# Patient Record
Sex: FEMALE | Race: WHITE | Employment: OTHER | URBAN - METROPOLITAN AREA
[De-identification: names, ages, dates, MRNs, and addresses within clinical notes are randomized per-mention and may not be internally consistent; named-entity substitution may affect disease eponyms.]

---

## 2018-01-17 PROBLEM — R19.7 DIARRHEA: Status: ACTIVE | Noted: 2018-01-17

## 2018-01-18 PROBLEM — E87.6 HYPOKALEMIA: Status: ACTIVE | Noted: 2018-01-18

## 2018-03-12 ENCOUNTER — TELEPHONE (OUTPATIENT)
Dept: FAMILY MEDICINE CLINIC | Age: 83
End: 2018-03-12

## 2018-03-22 ENCOUNTER — HOSPITAL ENCOUNTER (OUTPATIENT)
Dept: GENERAL RADIOLOGY | Age: 83
Discharge: HOME | End: 2018-03-24
Payer: COMMERCIAL

## 2018-03-22 ENCOUNTER — HOSPITAL ENCOUNTER (OUTPATIENT)
Age: 83
Discharge: HOME | End: 2018-03-24
Payer: COMMERCIAL

## 2018-03-22 DIAGNOSIS — M79.675 TOE PAIN, LEFT: ICD-10-CM

## 2018-03-22 PROCEDURE — 73660 X-RAY EXAM OF TOE(S): CPT

## 2018-03-23 DIAGNOSIS — M79.672 LEFT FOOT PAIN: Primary | ICD-10-CM

## 2018-04-09 RX ORDER — AMLODIPINE BESYLATE 5 MG/1
TABLET ORAL
Qty: 30 TABLET | Refills: 5 | Status: SHIPPED | OUTPATIENT
Start: 2018-04-09 | End: 2018-10-05 | Stop reason: SDUPTHER

## 2018-06-06 DIAGNOSIS — R05.9 COUGH: Primary | ICD-10-CM

## 2018-07-30 RX ORDER — PANTOPRAZOLE SODIUM 40 MG/1
40 TABLET, DELAYED RELEASE ORAL
Qty: 60 TABLET | Refills: 3 | Status: SHIPPED | OUTPATIENT
Start: 2018-07-30 | End: 2019-07-11 | Stop reason: SDUPTHER

## 2018-08-07 DIAGNOSIS — R05.9 COUGH: ICD-10-CM

## 2018-09-07 ENCOUNTER — OFFICE VISIT (OUTPATIENT)
Dept: FAMILY MEDICINE CLINIC | Age: 83
End: 2018-09-07
Payer: COMMERCIAL

## 2018-09-07 VITALS
DIASTOLIC BLOOD PRESSURE: 70 MMHG | OXYGEN SATURATION: 97 % | TEMPERATURE: 98.2 F | BODY MASS INDEX: 23.22 KG/M2 | WEIGHT: 123 LBS | SYSTOLIC BLOOD PRESSURE: 108 MMHG | HEART RATE: 76 BPM | HEIGHT: 61 IN | RESPIRATION RATE: 18 BRPM

## 2018-09-07 DIAGNOSIS — M79.671 RIGHT FOOT PAIN: ICD-10-CM

## 2018-09-07 DIAGNOSIS — M25.562 ACUTE PAIN OF LEFT KNEE: Primary | ICD-10-CM

## 2018-09-07 DIAGNOSIS — R05.9 COUGH: ICD-10-CM

## 2018-09-07 PROCEDURE — 1101F PT FALLS ASSESS-DOCD LE1/YR: CPT | Performed by: FAMILY MEDICINE

## 2018-09-07 PROCEDURE — 4040F PNEUMOC VAC/ADMIN/RCVD: CPT | Performed by: FAMILY MEDICINE

## 2018-09-07 PROCEDURE — G8420 CALC BMI NORM PARAMETERS: HCPCS | Performed by: FAMILY MEDICINE

## 2018-09-07 PROCEDURE — 99213 OFFICE O/P EST LOW 20 MIN: CPT | Performed by: FAMILY MEDICINE

## 2018-09-07 PROCEDURE — G8400 PT W/DXA NO RESULTS DOC: HCPCS | Performed by: FAMILY MEDICINE

## 2018-09-07 PROCEDURE — G8427 DOCREV CUR MEDS BY ELIG CLIN: HCPCS | Performed by: FAMILY MEDICINE

## 2018-09-07 PROCEDURE — 1123F ACP DISCUSS/DSCN MKR DOCD: CPT | Performed by: FAMILY MEDICINE

## 2018-09-07 PROCEDURE — 1090F PRES/ABSN URINE INCON ASSESS: CPT | Performed by: FAMILY MEDICINE

## 2018-09-07 PROCEDURE — 1036F TOBACCO NON-USER: CPT | Performed by: FAMILY MEDICINE

## 2018-09-07 RX ORDER — METHYLPREDNISOLONE 4 MG/1
TABLET ORAL
Qty: 1 KIT | Refills: 0 | Status: SHIPPED | OUTPATIENT
Start: 2018-09-07 | End: 2018-09-24

## 2018-09-07 ASSESSMENT — ENCOUNTER SYMPTOMS
CHEST TIGHTNESS: 0
BLOOD IN STOOL: 0
COLOR CHANGE: 0
FACIAL SWELLING: 0
ABDOMINAL PAIN: 0
SHORTNESS OF BREATH: 0
COUGH: 1
SORE THROAT: 0
BACK PAIN: 0
NAUSEA: 0
SINUS PRESSURE: 0
DIARRHEA: 0
ALLERGIC/IMMUNOLOGIC NEGATIVE: 1
PHOTOPHOBIA: 0
APNEA: 0
WHEEZING: 0
VOMITING: 0

## 2018-09-07 NOTE — PROGRESS NOTES
Gastrointestinal: Negative for abdominal pain, blood in stool, diarrhea, nausea and vomiting. Genitourinary: Negative for difficulty urinating, frequency and urgency. Musculoskeletal: Positive for arthralgias. Negative for back pain, joint swelling and myalgias. Skin: Negative for color change and rash. Allergic/Immunologic: Negative. Neurological: Negative for syncope, weakness, light-headedness and headaches. Hematological: Negative. Psychiatric/Behavioral: Negative for agitation, behavioral problems, confusion and self-injury. The patient is not nervous/anxious. All other systems reviewed and are negative. Physical Exam   Constitutional: She is oriented to person, place, and time. She appears well-developed and well-nourished. No distress. HENT:   Head: Normocephalic and atraumatic. Nose: Nose normal.   Mouth/Throat: Oropharynx is clear and moist.   Eyes: Pupils are equal, round, and reactive to light. Conjunctivae and EOM are normal.   Neck: Normal range of motion. Neck supple. No JVD present. No thyromegaly present. Cardiovascular: Normal rate, regular rhythm and normal heart sounds. Exam reveals no gallop and no friction rub. No murmur heard. Pulmonary/Chest: Effort normal and breath sounds normal. No respiratory distress. She has no wheezes. Abdominal: Soft. Bowel sounds are normal. She exhibits no distension. There is no tenderness. There is no rebound and no guarding. Musculoskeletal: Normal range of motion. Left knee: She exhibits bony tenderness. Tenderness found. Right foot: There is tenderness. Lymphadenopathy:     She has no cervical adenopathy. Neurological: She is alert and oriented to person, place, and time. She has normal reflexes. No cranial nerve deficit. She exhibits normal muscle tone. Coordination normal.   Skin: Skin is warm and dry. No rash noted. No erythema. Psychiatric: She has a normal mood and affect.  Her behavior is normal.

## 2018-09-21 ENCOUNTER — TELEPHONE (OUTPATIENT)
Dept: ORTHOPEDIC SURGERY | Age: 83
End: 2018-09-21

## 2018-09-21 DIAGNOSIS — M79.641 RIGHT HAND PAIN: Primary | ICD-10-CM

## 2018-09-24 ENCOUNTER — OFFICE VISIT (OUTPATIENT)
Dept: FAMILY MEDICINE CLINIC | Age: 83
End: 2018-09-24
Payer: COMMERCIAL

## 2018-09-24 ENCOUNTER — OFFICE VISIT (OUTPATIENT)
Dept: ORTHOPEDIC SURGERY | Age: 83
End: 2018-09-24
Payer: COMMERCIAL

## 2018-09-24 VITALS
HEART RATE: 82 BPM | BODY MASS INDEX: 23.95 KG/M2 | HEIGHT: 60 IN | WEIGHT: 122 LBS | RESPIRATION RATE: 16 BRPM | SYSTOLIC BLOOD PRESSURE: 145 MMHG | TEMPERATURE: 98.1 F | DIASTOLIC BLOOD PRESSURE: 72 MMHG

## 2018-09-24 VITALS
TEMPERATURE: 98.2 F | DIASTOLIC BLOOD PRESSURE: 64 MMHG | SYSTOLIC BLOOD PRESSURE: 134 MMHG | RESPIRATION RATE: 18 BRPM | WEIGHT: 120 LBS | BODY MASS INDEX: 23.56 KG/M2 | OXYGEN SATURATION: 96 % | HEART RATE: 85 BPM | HEIGHT: 60 IN

## 2018-09-24 DIAGNOSIS — F51.01 PRIMARY INSOMNIA: ICD-10-CM

## 2018-09-24 DIAGNOSIS — M65.341 TRIGGER FINGER, RIGHT RING FINGER: Primary | ICD-10-CM

## 2018-09-24 DIAGNOSIS — J20.9 ACUTE BRONCHITIS, UNSPECIFIED ORGANISM: Primary | ICD-10-CM

## 2018-09-24 PROCEDURE — G8427 DOCREV CUR MEDS BY ELIG CLIN: HCPCS | Performed by: FAMILY MEDICINE

## 2018-09-24 PROCEDURE — 99212 OFFICE O/P EST SF 10 MIN: CPT | Performed by: ORTHOPAEDIC SURGERY

## 2018-09-24 PROCEDURE — G8420 CALC BMI NORM PARAMETERS: HCPCS | Performed by: ORTHOPAEDIC SURGERY

## 2018-09-24 PROCEDURE — G8420 CALC BMI NORM PARAMETERS: HCPCS | Performed by: FAMILY MEDICINE

## 2018-09-24 PROCEDURE — 20550 NJX 1 TENDON SHEATH/LIGAMENT: CPT | Performed by: ORTHOPAEDIC SURGERY

## 2018-09-24 PROCEDURE — G8400 PT W/DXA NO RESULTS DOC: HCPCS | Performed by: ORTHOPAEDIC SURGERY

## 2018-09-24 PROCEDURE — 1123F ACP DISCUSS/DSCN MKR DOCD: CPT | Performed by: ORTHOPAEDIC SURGERY

## 2018-09-24 PROCEDURE — 4040F PNEUMOC VAC/ADMIN/RCVD: CPT | Performed by: ORTHOPAEDIC SURGERY

## 2018-09-24 PROCEDURE — 1101F PT FALLS ASSESS-DOCD LE1/YR: CPT | Performed by: ORTHOPAEDIC SURGERY

## 2018-09-24 PROCEDURE — 1123F ACP DISCUSS/DSCN MKR DOCD: CPT | Performed by: FAMILY MEDICINE

## 2018-09-24 PROCEDURE — G8400 PT W/DXA NO RESULTS DOC: HCPCS | Performed by: FAMILY MEDICINE

## 2018-09-24 PROCEDURE — 1090F PRES/ABSN URINE INCON ASSESS: CPT | Performed by: FAMILY MEDICINE

## 2018-09-24 PROCEDURE — 99213 OFFICE O/P EST LOW 20 MIN: CPT | Performed by: FAMILY MEDICINE

## 2018-09-24 PROCEDURE — 4040F PNEUMOC VAC/ADMIN/RCVD: CPT | Performed by: FAMILY MEDICINE

## 2018-09-24 PROCEDURE — 1090F PRES/ABSN URINE INCON ASSESS: CPT | Performed by: ORTHOPAEDIC SURGERY

## 2018-09-24 PROCEDURE — 1101F PT FALLS ASSESS-DOCD LE1/YR: CPT | Performed by: FAMILY MEDICINE

## 2018-09-24 PROCEDURE — 1036F TOBACCO NON-USER: CPT | Performed by: FAMILY MEDICINE

## 2018-09-24 PROCEDURE — G8427 DOCREV CUR MEDS BY ELIG CLIN: HCPCS | Performed by: ORTHOPAEDIC SURGERY

## 2018-09-24 PROCEDURE — 1036F TOBACCO NON-USER: CPT | Performed by: ORTHOPAEDIC SURGERY

## 2018-09-24 RX ORDER — DOXYCYCLINE HYCLATE 100 MG
100 TABLET ORAL 2 TIMES DAILY
Qty: 20 TABLET | Refills: 0 | Status: SHIPPED | OUTPATIENT
Start: 2018-09-24 | End: 2018-09-26

## 2018-09-24 RX ORDER — PREDNISONE 20 MG/1
TABLET ORAL
Qty: 11 TABLET | Refills: 1 | Status: SHIPPED | OUTPATIENT
Start: 2018-09-24 | End: 2018-11-30 | Stop reason: SDUPTHER

## 2018-09-24 RX ORDER — BETAMETHASONE SODIUM PHOSPHATE AND BETAMETHASONE ACETATE 3; 3 MG/ML; MG/ML
6 INJECTION, SUSPENSION INTRA-ARTICULAR; INTRALESIONAL; INTRAMUSCULAR; SOFT TISSUE ONCE
Status: COMPLETED | OUTPATIENT
Start: 2018-09-24 | End: 2018-09-24

## 2018-09-24 RX ORDER — TRAZODONE HYDROCHLORIDE 50 MG/1
50 TABLET ORAL NIGHTLY
Qty: 30 TABLET | Refills: 2 | Status: SHIPPED | OUTPATIENT
Start: 2018-09-24 | End: 2019-10-07 | Stop reason: SDUPTHER

## 2018-09-24 RX ADMIN — BETAMETHASONE SODIUM PHOSPHATE AND BETAMETHASONE ACETATE 6 MG: 3; 3 INJECTION, SUSPENSION INTRA-ARTICULAR; INTRALESIONAL; INTRAMUSCULAR; SOFT TISSUE at 11:43

## 2018-09-24 ASSESSMENT — ENCOUNTER SYMPTOMS
CHEST TIGHTNESS: 0
APNEA: 0
SHORTNESS OF BREATH: 0
BACK PAIN: 0
COUGH: 1
DIARRHEA: 0
FACIAL SWELLING: 0
COLOR CHANGE: 0
BLOOD IN STOOL: 0
SINUS PRESSURE: 0
WHEEZING: 0
ALLERGIC/IMMUNOLOGIC NEGATIVE: 1
PHOTOPHOBIA: 0

## 2018-09-24 ASSESSMENT — PATIENT HEALTH QUESTIONNAIRE - PHQ9
SUM OF ALL RESPONSES TO PHQ QUESTIONS 1-9: 0
SUM OF ALL RESPONSES TO PHQ9 QUESTIONS 1 & 2: 0
2. FEELING DOWN, DEPRESSED OR HOPELESS: 0
1. LITTLE INTEREST OR PLEASURE IN DOING THINGS: 0
SUM OF ALL RESPONSES TO PHQ QUESTIONS 1-9: 0

## 2018-09-24 NOTE — PROGRESS NOTES
Department of Orthopedic Surgery      CHIEF COMPLAINT:  Trigger finger    HISTORY OF PRESENT ILLNESS:                The patient is a 80 y.o. RHD female  complaining of \"locking\" of the right ring finger. She complains the finger is now \"locking up\" and becoming painful. Denies any injury. Denies N/T in digits. She has had two previous injections to the ring finger with good results. Almost 8 months ago the patient had a right ring finger trigger cortisone injection. She reports good response with the injection. She started noticing symptoms again about a month ago. She is requesting another injection at today's visit. Past Medical History:        Diagnosis Date    Arthritis     GERD (gastroesophageal reflux disease)     Hernia, hiatal     Hyperlipidemia     Hypertension      Past Surgical History:        Procedure Laterality Date    UPPER GASTROINTESTINAL ENDOSCOPY      UPPER GASTROINTESTINAL ENDOSCOPY  06/20/2016    1 biopsy     Current Medications:   Current Facility-Administered Medications: betamethasone acetate-betamethasone sodium phosphate (CELESTONE) injection 6 mg, 6 mg, Intra-articular, Once  Allergies:  Patient has no known allergies. Social History:   TOBACCO:   reports that she has never smoked. She has never used smokeless tobacco.  ETOH:   reports that she does not drink alcohol. DRUGS:   reports that she does not use drugs. Family History:   History reviewed. No pertinent family history.     REVIEW OF SYSTEMS:  CONSTITUTIONAL:  negative for  fevers, chills  EYES:  negative for blurred vision, visual disturbance  HEENT:  negative for  hearing loss, voice change  RESPIRATORY:  negative for  dyspnea, wheezing  CARDIOVASCULAR:  negative for  chest pain, palpitations  GASTROINTESTINAL:  negative for nausea, vomiting  GENITOURINARY:  negative for frequency, urinary incontinence  HEMATOLOGIC/LYMPHATIC:  negative for bleeding and petechiae  MUSCULOSKELETAL:  positive for

## 2018-09-24 NOTE — PROGRESS NOTES
DAILY 60 tablet 5    losartan (COZAAR) 50 MG tablet Take 1 tablet by mouth daily 30 tablet 3    fluticasone (FLOVENT HFA) 220 MCG/ACT inhaler 2puffs twice per day      hydrochlorothiazide (HYDRODIURIL) 12.5 MG tablet Take 1 tablet by mouth daily 30 tablet 3    simvastatin (ZOCOR) 10 MG tablet Take 10 mg by mouth nightly.  metoclopramide (REGLAN) 10 MG tablet Take 10 mg by mouth 3 times daily. No current facility-administered medications for this visit. No Known Allergies    Social History     Social History    Marital status:      Spouse name: N/A    Number of children: N/A    Years of education: N/A     Occupational History    retired      Social History Main Topics    Smoking status: Never Smoker    Smokeless tobacco: Never Used    Alcohol use No    Drug use: No    Sexual activity: Not Asked     Other Topics Concern    None     Social History Narrative    None       No family history on file. Review of Systems   Constitutional: Negative. HENT: Negative for facial swelling, hearing loss, nosebleeds and sinus pressure. Eyes: Negative for photophobia and visual disturbance. Respiratory: Negative for apnea, chest tightness, shortness of breath and wheezing. Cardiovascular: Negative for palpitations and leg swelling. Gastrointestinal: Negative for blood in stool and diarrhea. Genitourinary: Negative for difficulty urinating, frequency and urgency. Musculoskeletal: Negative for back pain. Skin: Negative for color change. Allergic/Immunologic: Negative. Neurological: Negative for syncope and light-headedness. Hematological: Negative. Psychiatric/Behavioral: Negative for agitation, behavioral problems, confusion and self-injury. The patient is not nervous/anxious. All other systems reviewed and are negative. Physical Exam   Constitutional: She is oriented to person, place, and time. She appears well-developed and well-nourished.  No distress. HENT:   Head: Normocephalic and atraumatic. Nose: Nose normal.   Mouth/Throat: Oropharynx is clear and moist.   Eyes: Pupils are equal, round, and reactive to light. Conjunctivae and EOM are normal.   Neck: Normal range of motion. Neck supple. No JVD present. No thyromegaly present. Cardiovascular: Normal rate, regular rhythm and normal heart sounds. Exam reveals no gallop and no friction rub. No murmur heard. Pulmonary/Chest: Effort normal. No respiratory distress. She has no wheezes. She has rhonchi. Abdominal: Soft. Bowel sounds are normal. She exhibits no distension. There is no tenderness. There is no rebound and no guarding. Musculoskeletal: Normal range of motion. Lymphadenopathy:     She has no cervical adenopathy. Neurological: She is alert and oriented to person, place, and time. She has normal reflexes. No cranial nerve deficit. She exhibits normal muscle tone. Coordination normal.   Skin: Skin is warm and dry. No rash noted. No erythema. Psychiatric: She has a normal mood and affect. Her behavior is normal. Judgment normal.   Nursing note and vitals reviewed. ASSESSMENT/PLAN:    Amanda Burnett was seen today for cough, nasal congestion and pharyngitis. Diagnoses and all orders for this visit:    Acute bronchitis, unspecified organism  -     doxycycline hyclate (VIBRA-TABS) 100 MG tablet; Take 1 tablet by mouth 2 times daily for 10 days  -     predniSONE (DELTASONE) 20 MG tablet; 2 tabs qd x 3 days  1 tab qd x 3 days 0.5 tab qd x 3 days    Primary insomnia  -     traZODone (DESYREL) 50 MG tablet;  Take 1 tablet by mouth nightly            Jordan Vega DO    9/24/2018  1:54 PM

## 2018-09-26 ENCOUNTER — TELEPHONE (OUTPATIENT)
Dept: FAMILY MEDICINE CLINIC | Age: 83
End: 2018-09-26

## 2018-09-26 RX ORDER — LEVOFLOXACIN 500 MG/1
500 TABLET, FILM COATED ORAL DAILY
Qty: 10 TABLET | Refills: 0 | Status: SHIPPED | OUTPATIENT
Start: 2018-09-26 | End: 2018-11-30 | Stop reason: SDUPTHER

## 2018-10-01 ENCOUNTER — TELEPHONE (OUTPATIENT)
Dept: FAMILY MEDICINE CLINIC | Age: 83
End: 2018-10-01

## 2018-10-01 DIAGNOSIS — R05.9 COUGH: ICD-10-CM

## 2018-10-01 DIAGNOSIS — R05.9 COUGH: Primary | ICD-10-CM

## 2018-10-05 RX ORDER — AMLODIPINE BESYLATE 5 MG/1
TABLET ORAL
Qty: 30 TABLET | Refills: 5 | Status: SHIPPED | OUTPATIENT
Start: 2018-10-05 | End: 2019-03-25 | Stop reason: SDUPTHER

## 2018-11-30 DIAGNOSIS — J20.9 ACUTE BRONCHITIS, UNSPECIFIED ORGANISM: ICD-10-CM

## 2018-11-30 DIAGNOSIS — R05.9 COUGH: ICD-10-CM

## 2018-11-30 RX ORDER — PREDNISONE 20 MG/1
TABLET ORAL
Qty: 11 TABLET | Refills: 1 | Status: SHIPPED | OUTPATIENT
Start: 2018-11-30 | End: 2019-07-15

## 2018-11-30 RX ORDER — LEVOFLOXACIN 500 MG/1
500 TABLET, FILM COATED ORAL DAILY
Qty: 10 TABLET | Refills: 0 | Status: SHIPPED | OUTPATIENT
Start: 2018-11-30 | End: 2018-12-10

## 2019-01-01 DIAGNOSIS — J20.9 ACUTE BRONCHITIS, UNSPECIFIED ORGANISM: ICD-10-CM

## 2019-01-01 DIAGNOSIS — R05.9 COUGH: ICD-10-CM

## 2019-03-25 ENCOUNTER — OFFICE VISIT (OUTPATIENT)
Dept: ORTHOPEDIC SURGERY | Age: 84
End: 2019-03-25
Payer: COMMERCIAL

## 2019-03-25 VITALS — SYSTOLIC BLOOD PRESSURE: 124 MMHG | RESPIRATION RATE: 18 BRPM | DIASTOLIC BLOOD PRESSURE: 78 MMHG

## 2019-03-25 DIAGNOSIS — M65.341 TRIGGER FINGER, RIGHT RING FINGER: ICD-10-CM

## 2019-03-25 DIAGNOSIS — M79.641 RIGHT HAND PAIN: Primary | ICD-10-CM

## 2019-03-25 PROCEDURE — 4040F PNEUMOC VAC/ADMIN/RCVD: CPT | Performed by: ORTHOPAEDIC SURGERY

## 2019-03-25 PROCEDURE — 1123F ACP DISCUSS/DSCN MKR DOCD: CPT | Performed by: ORTHOPAEDIC SURGERY

## 2019-03-25 PROCEDURE — 1090F PRES/ABSN URINE INCON ASSESS: CPT | Performed by: ORTHOPAEDIC SURGERY

## 2019-03-25 PROCEDURE — G8400 PT W/DXA NO RESULTS DOC: HCPCS | Performed by: ORTHOPAEDIC SURGERY

## 2019-03-25 PROCEDURE — 99212 OFFICE O/P EST SF 10 MIN: CPT | Performed by: ORTHOPAEDIC SURGERY

## 2019-03-25 PROCEDURE — G8484 FLU IMMUNIZE NO ADMIN: HCPCS | Performed by: ORTHOPAEDIC SURGERY

## 2019-03-25 PROCEDURE — 1101F PT FALLS ASSESS-DOCD LE1/YR: CPT | Performed by: ORTHOPAEDIC SURGERY

## 2019-03-25 PROCEDURE — G8420 CALC BMI NORM PARAMETERS: HCPCS | Performed by: ORTHOPAEDIC SURGERY

## 2019-03-25 PROCEDURE — 1036F TOBACCO NON-USER: CPT | Performed by: ORTHOPAEDIC SURGERY

## 2019-03-25 PROCEDURE — G8427 DOCREV CUR MEDS BY ELIG CLIN: HCPCS | Performed by: ORTHOPAEDIC SURGERY

## 2019-03-25 RX ORDER — AMLODIPINE BESYLATE 5 MG/1
TABLET ORAL
Qty: 30 TABLET | Refills: 5 | Status: SHIPPED | OUTPATIENT
Start: 2019-03-25 | End: 2019-09-30 | Stop reason: SDUPTHER

## 2019-04-18 ENCOUNTER — OFFICE VISIT (OUTPATIENT)
Dept: FAMILY MEDICINE CLINIC | Age: 84
End: 2019-04-18
Payer: COMMERCIAL

## 2019-04-18 VITALS
OXYGEN SATURATION: 97 % | BODY MASS INDEX: 24.74 KG/M2 | HEIGHT: 60 IN | DIASTOLIC BLOOD PRESSURE: 70 MMHG | RESPIRATION RATE: 17 BRPM | TEMPERATURE: 98.2 F | SYSTOLIC BLOOD PRESSURE: 126 MMHG | WEIGHT: 126 LBS | HEART RATE: 74 BPM

## 2019-04-18 DIAGNOSIS — R05.9 COUGH: ICD-10-CM

## 2019-04-18 DIAGNOSIS — J20.9 ACUTE BRONCHITIS, UNSPECIFIED ORGANISM: ICD-10-CM

## 2019-04-18 DIAGNOSIS — J20.9 ACUTE BRONCHITIS, UNSPECIFIED ORGANISM: Primary | ICD-10-CM

## 2019-04-18 DIAGNOSIS — M79.672 LEFT FOOT PAIN: ICD-10-CM

## 2019-04-18 PROCEDURE — G8427 DOCREV CUR MEDS BY ELIG CLIN: HCPCS | Performed by: FAMILY MEDICINE

## 2019-04-18 PROCEDURE — G8400 PT W/DXA NO RESULTS DOC: HCPCS | Performed by: FAMILY MEDICINE

## 2019-04-18 PROCEDURE — G8420 CALC BMI NORM PARAMETERS: HCPCS | Performed by: FAMILY MEDICINE

## 2019-04-18 PROCEDURE — 1123F ACP DISCUSS/DSCN MKR DOCD: CPT | Performed by: FAMILY MEDICINE

## 2019-04-18 PROCEDURE — 4040F PNEUMOC VAC/ADMIN/RCVD: CPT | Performed by: FAMILY MEDICINE

## 2019-04-18 PROCEDURE — 1090F PRES/ABSN URINE INCON ASSESS: CPT | Performed by: FAMILY MEDICINE

## 2019-04-18 PROCEDURE — 1036F TOBACCO NON-USER: CPT | Performed by: FAMILY MEDICINE

## 2019-04-18 PROCEDURE — 99213 OFFICE O/P EST LOW 20 MIN: CPT | Performed by: FAMILY MEDICINE

## 2019-04-18 RX ORDER — DOXYCYCLINE HYCLATE 100 MG
100 TABLET ORAL 2 TIMES DAILY
Qty: 20 TABLET | Refills: 0 | Status: SHIPPED | OUTPATIENT
Start: 2019-04-18 | End: 2019-04-28

## 2019-04-18 ASSESSMENT — ENCOUNTER SYMPTOMS
APNEA: 0
WHEEZING: 0
PHOTOPHOBIA: 0
SHORTNESS OF BREATH: 0
DIARRHEA: 0
BACK PAIN: 0
VOMITING: 0
NAUSEA: 0
ALLERGIC/IMMUNOLOGIC NEGATIVE: 1
COLOR CHANGE: 0
FACIAL SWELLING: 0
SINUS PRESSURE: 0
BLOOD IN STOOL: 0
CHEST TIGHTNESS: 0
ABDOMINAL PAIN: 0
COUGH: 1
SORE THROAT: 0

## 2019-04-18 ASSESSMENT — PATIENT HEALTH QUESTIONNAIRE - PHQ9
SUM OF ALL RESPONSES TO PHQ QUESTIONS 1-9: 0
SUM OF ALL RESPONSES TO PHQ9 QUESTIONS 1 & 2: 0
2. FEELING DOWN, DEPRESSED OR HOPELESS: 0
SUM OF ALL RESPONSES TO PHQ QUESTIONS 1-9: 0
1. LITTLE INTEREST OR PLEASURE IN DOING THINGS: 0

## 2019-04-18 NOTE — PROGRESS NOTES
Chief Complaint:   Chief Complaint   Patient presents with    Foot Pain     left foot pain, sees dr Dieudonne Zavaleta soon    Cough     has been coughing alot more       Foot Pain   This is a new problem. The current episode started yesterday. The problem has been gradually improving. Associated symptoms include coughing. Pertinent negatives include no abdominal pain, arthralgias, chest pain, congestion, headaches, joint swelling, myalgias, nausea, rash, sore throat, vomiting or weakness. Cough   This is a recurrent problem. The current episode started in the past 7 days. The problem occurs constantly. The cough is productive of sputum. Pertinent negatives include no chest pain, headaches, myalgias, rash, sore throat, shortness of breath or wheezing. Her past medical history is significant for bronchitis. Patient Active Problem List   Diagnosis    Iron deficiency anemia due to chronic blood loss    Near syncope    Essential hypertension    Acute kidney injury ([facility] [address]    Hypertension    Hyperlipidemia    Hernia, hiatal    GERD (gastroesophageal reflux disease)    Arthritis    Diarrhea    Hypokalemia    Primary insomnia       Past Medical History:   Diagnosis Date    Arthritis     GERD (gastroesophageal reflux disease)     Hernia, hiatal     Hyperlipidemia     Hypertension        Past Surgical History:   Procedure Laterality Date    UPPER GASTROINTESTINAL ENDOSCOPY      UPPER GASTROINTESTINAL ENDOSCOPY  06/20/2016    1 biopsy       Current Outpatient Medications   Medication Sig Dispense Refill    HYDROcodone-homatropine (HYCODAN) 5-1.5 MG/5ML syrup Take 5 mLs by mouth every 6 hours as needed (cough) for up to 30 days.  1 teaspoon every 6hours as needed 240 mL 0    doxycycline hyclate (VIBRA-TABS) 100 MG tablet Take 1 tablet by mouth 2 times daily for 10 days 20 tablet 0    amLODIPine (NORVASC) 5 MG tablet TAKE 1 TABLET BY MOUTH EVERY DAY 30 tablet 5    predniSONE (DELTASONE) 20 MG tablet 2 tabs qd x 3 days  1 tab qd x 3 days 0.5 tab qd x 3 days 11 tablet 1    traZODone (DESYREL) 50 MG tablet Take 1 tablet by mouth nightly 30 tablet 2    pantoprazole (PROTONIX) 40 MG tablet Take 1 tablet by mouth 2 times daily (before meals) 60 tablet 3    valACYclovir (VALTREX) 1 g tablet   0    ferrous sulfate 325 (65 Fe) MG tablet TAKE 1 TABLET BY MOUTH TWICE DAILY 60 tablet 5    losartan (COZAAR) 50 MG tablet Take 1 tablet by mouth daily 30 tablet 3    fluticasone (FLOVENT HFA) 220 MCG/ACT inhaler 2puffs twice per day      hydrochlorothiazide (HYDRODIURIL) 12.5 MG tablet Take 1 tablet by mouth daily 30 tablet 3    simvastatin (ZOCOR) 10 MG tablet Take 10 mg by mouth nightly.  metoclopramide (REGLAN) 10 MG tablet Take 10 mg by mouth 3 times daily. No current facility-administered medications for this visit.         No Known Allergies    Social History     Socioeconomic History    Marital status:      Spouse name: None    Number of children: None    Years of education: None    Highest education level: None   Occupational History    Occupation: retired   Social Needs    Financial resource strain: None    Food insecurity:     Worry: None     Inability: None    Transportation needs:     Medical: None     Non-medical: None   Tobacco Use    Smoking status: Never Smoker    Smokeless tobacco: Never Used   Substance and Sexual Activity    Alcohol use: No     Alcohol/week: 0.0 oz    Drug use: No    Sexual activity: None   Lifestyle    Physical activity:     Days per week: None     Minutes per session: None    Stress: None   Relationships    Social connections:     Talks on phone: None     Gets together: None     Attends Mu-ism service: None     Active member of club or organization: None     Attends meetings of clubs or organizations: None     Relationship status: None    Intimate partner violence:     Fear of current or ex partner: None     Emotionally abused: None     Physically abused: None     Forced sexual activity: None   Other Topics Concern    None   Social History Narrative    None       No family history on file. Review of Systems   Constitutional: Negative. HENT: Negative for congestion, facial swelling, hearing loss, nosebleeds, sinus pressure and sore throat. Eyes: Negative for photophobia and visual disturbance. Respiratory: Positive for cough. Negative for apnea, chest tightness, shortness of breath and wheezing. Cardiovascular: Negative for chest pain, palpitations and leg swelling. Gastrointestinal: Negative for abdominal pain, blood in stool, diarrhea, nausea and vomiting. Genitourinary: Negative for difficulty urinating, frequency and urgency. Musculoskeletal: Negative for arthralgias, back pain, joint swelling and myalgias. Left foot pain     Skin: Negative for color change and rash. Allergic/Immunologic: Negative. Neurological: Negative for syncope, weakness, light-headedness and headaches. Hematological: Negative. Psychiatric/Behavioral: Negative for agitation, behavioral problems, confusion and self-injury. The patient is not nervous/anxious. All other systems reviewed and are negative. Physical Exam   Constitutional: She is oriented to person, place, and time. She appears well-developed and well-nourished. No distress. HENT:   Head: Normocephalic and atraumatic. Nose: Nose normal.   Mouth/Throat: Oropharynx is clear and moist.   Eyes: Pupils are equal, round, and reactive to light. Conjunctivae and EOM are normal.   Neck: Normal range of motion. Neck supple. No JVD present. No thyromegaly present. Cardiovascular: Normal rate, regular rhythm and normal heart sounds. Exam reveals no gallop and no friction rub. No murmur heard. Pulmonary/Chest: Effort normal and breath sounds normal. No respiratory distress. She has no wheezes. Abdominal: Soft. Bowel sounds are normal. She exhibits no distension. There is no tenderness.

## 2019-06-07 DIAGNOSIS — R05.9 COUGH: ICD-10-CM

## 2019-06-07 DIAGNOSIS — J20.9 ACUTE BRONCHITIS, UNSPECIFIED ORGANISM: ICD-10-CM

## 2019-07-11 RX ORDER — PANTOPRAZOLE SODIUM 40 MG/1
40 TABLET, DELAYED RELEASE ORAL
Qty: 60 TABLET | Refills: 3 | Status: SHIPPED | OUTPATIENT
Start: 2019-07-11

## 2019-07-15 ENCOUNTER — TELEPHONE (OUTPATIENT)
Dept: ADMINISTRATIVE | Age: 84
End: 2019-07-15

## 2019-07-15 ENCOUNTER — OFFICE VISIT (OUTPATIENT)
Dept: FAMILY MEDICINE CLINIC | Age: 84
End: 2019-07-15
Payer: COMMERCIAL

## 2019-07-15 VITALS
OXYGEN SATURATION: 94 % | BODY MASS INDEX: 24.35 KG/M2 | DIASTOLIC BLOOD PRESSURE: 68 MMHG | HEIGHT: 60 IN | HEART RATE: 80 BPM | SYSTOLIC BLOOD PRESSURE: 114 MMHG | WEIGHT: 124 LBS | RESPIRATION RATE: 16 BRPM

## 2019-07-15 DIAGNOSIS — L30.9 DERMATITIS: Primary | ICD-10-CM

## 2019-07-15 PROCEDURE — G8427 DOCREV CUR MEDS BY ELIG CLIN: HCPCS | Performed by: FAMILY MEDICINE

## 2019-07-15 PROCEDURE — 99213 OFFICE O/P EST LOW 20 MIN: CPT | Performed by: FAMILY MEDICINE

## 2019-07-15 PROCEDURE — G8420 CALC BMI NORM PARAMETERS: HCPCS | Performed by: FAMILY MEDICINE

## 2019-07-15 PROCEDURE — 1123F ACP DISCUSS/DSCN MKR DOCD: CPT | Performed by: FAMILY MEDICINE

## 2019-07-15 PROCEDURE — 1090F PRES/ABSN URINE INCON ASSESS: CPT | Performed by: FAMILY MEDICINE

## 2019-07-15 PROCEDURE — 4040F PNEUMOC VAC/ADMIN/RCVD: CPT | Performed by: FAMILY MEDICINE

## 2019-07-15 PROCEDURE — 1036F TOBACCO NON-USER: CPT | Performed by: FAMILY MEDICINE

## 2019-07-15 PROCEDURE — G8400 PT W/DXA NO RESULTS DOC: HCPCS | Performed by: FAMILY MEDICINE

## 2019-07-15 RX ORDER — MOMETASONE FUROATE 1 MG/G
CREAM TOPICAL
Qty: 45 G | Refills: 0 | Status: SHIPPED | OUTPATIENT
Start: 2019-07-15

## 2019-07-15 ASSESSMENT — ENCOUNTER SYMPTOMS
COLOR CHANGE: 0
VOMITING: 0
NAUSEA: 0
DIARRHEA: 0
SHORTNESS OF BREATH: 0
APNEA: 0
BLOOD IN STOOL: 0
COUGH: 0
CHEST TIGHTNESS: 0
ALLERGIC/IMMUNOLOGIC NEGATIVE: 1
ABDOMINAL PAIN: 0
BACK PAIN: 0
SINUS PRESSURE: 0
PHOTOPHOBIA: 0
FACIAL SWELLING: 0
SORE THROAT: 0
WHEEZING: 0

## 2019-07-15 ASSESSMENT — PATIENT HEALTH QUESTIONNAIRE - PHQ9
1. LITTLE INTEREST OR PLEASURE IN DOING THINGS: 0
SUM OF ALL RESPONSES TO PHQ9 QUESTIONS 1 & 2: 0
SUM OF ALL RESPONSES TO PHQ QUESTIONS 1-9: 0
2. FEELING DOWN, DEPRESSED OR HOPELESS: 0
SUM OF ALL RESPONSES TO PHQ QUESTIONS 1-9: 0

## 2019-07-15 NOTE — PROGRESS NOTES
mouth 3 times daily. No current facility-administered medications for this visit. No Known Allergies    Social History     Socioeconomic History    Marital status:      Spouse name: None    Number of children: None    Years of education: None    Highest education level: None   Occupational History    Occupation: retired     Employer: RETIRED   Social Needs    Financial resource strain: None    Food insecurity:     Worry: None     Inability: None    Transportation needs:     Medical: None     Non-medical: None   Tobacco Use    Smoking status: Never Smoker    Smokeless tobacco: Never Used   Substance and Sexual Activity    Alcohol use: No     Alcohol/week: 0.0 standard drinks    Drug use: No    Sexual activity: None   Lifestyle    Physical activity:     Days per week: None     Minutes per session: None    Stress: None   Relationships    Social connections:     Talks on phone: None     Gets together: None     Attends Cheondoism service: None     Active member of club or organization: None     Attends meetings of clubs or organizations: None     Relationship status: None    Intimate partner violence:     Fear of current or ex partner: None     Emotionally abused: None     Physically abused: None     Forced sexual activity: None   Other Topics Concern    None   Social History Narrative    None       No family history on file. Review of Systems   Constitutional: Negative. HENT: Negative for congestion, facial swelling, hearing loss, nosebleeds, sinus pressure and sore throat. Eyes: Negative for photophobia and visual disturbance. Respiratory: Negative for apnea, cough, chest tightness, shortness of breath and wheezing. Cardiovascular: Negative for chest pain, palpitations and leg swelling. Gastrointestinal: Negative for abdominal pain, blood in stool, diarrhea, nausea and vomiting. Genitourinary: Negative for difficulty urinating, frequency and urgency. Musculoskeletal: Negative for arthralgias, back pain, joint swelling and myalgias. Skin: Positive for rash. Negative for color change. Allergic/Immunologic: Negative. Neurological: Negative for syncope, weakness, light-headedness and headaches. Hematological: Negative. Psychiatric/Behavioral: Negative for agitation, behavioral problems, confusion and self-injury. The patient is not nervous/anxious. All other systems reviewed and are negative. Physical Exam   Constitutional: She is oriented to person, place, and time. She appears well-developed and well-nourished. No distress. HENT:   Head: Normocephalic and atraumatic. Nose: Nose normal.   Mouth/Throat: Oropharynx is clear and moist.   Eyes: Pupils are equal, round, and reactive to light. Conjunctivae and EOM are normal.   Neck: Normal range of motion. Neck supple. No JVD present. No thyromegaly present. Cardiovascular: Normal rate, regular rhythm and normal heart sounds. Exam reveals no gallop and no friction rub. No murmur heard. Pulmonary/Chest: Effort normal and breath sounds normal. No respiratory distress. She has no wheezes. Abdominal: Soft. Bowel sounds are normal. She exhibits no distension. There is no tenderness. There is no rebound and no guarding. Musculoskeletal: Normal range of motion. Lymphadenopathy:     She has no cervical adenopathy. Neurological: She is alert and oriented to person, place, and time. She has normal reflexes. No cranial nerve deficit. She exhibits normal muscle tone. Coordination normal.   Skin: Skin is warm and dry. Rash noted. Rash is papular. No erythema. Psychiatric: She has a normal mood and affect. Her behavior is normal. Judgment normal.   Nursing note and vitals reviewed. ASSESSMENT/PLAN:    Izabel Noland was seen today for rash.     Diagnoses and all orders for this visit:    Dermatitis    Other orders  -     mometasone (ELOCON) 0.1 % cream; Apply topically

## 2019-08-01 DIAGNOSIS — J20.9 ACUTE BRONCHITIS, UNSPECIFIED ORGANISM: ICD-10-CM

## 2019-08-01 DIAGNOSIS — R05.9 COUGH: ICD-10-CM

## 2019-09-09 DIAGNOSIS — J20.9 ACUTE BRONCHITIS, UNSPECIFIED ORGANISM: ICD-10-CM

## 2019-09-09 DIAGNOSIS — R05.9 COUGH: ICD-10-CM

## 2019-09-15 ENCOUNTER — APPOINTMENT (OUTPATIENT)
Dept: CT IMAGING | Age: 84
End: 2019-09-15
Payer: COMMERCIAL

## 2019-09-15 ENCOUNTER — HOSPITAL ENCOUNTER (EMERGENCY)
Age: 84
Discharge: HOME | End: 2019-09-15
Payer: COMMERCIAL

## 2019-09-15 DIAGNOSIS — R51.9 NONINTRACTABLE HEADACHE, UNSPECIFIED CHRONICITY PATTERN, UNSPECIFIED HEADACHE TYPE: ICD-10-CM

## 2019-09-15 LAB
ANION GAP SERPL CALCULATED.3IONS-SCNC: 11 MMOL/L (ref 7–16)
BASOPHILS ABSOLUTE: 0.04 E9/L (ref 0–0.2)
BASOPHILS RELATIVE PERCENT: 0.5 % (ref 0–2)
BUN BLDV-MCNC: 25 MG/DL (ref 8–23)
CALCIUM SERPL-MCNC: 9.5 MG/DL (ref 8.6–10.2)
CHLORIDE BLD-SCNC: 102 MMOL/L (ref 98–107)
CO2: 26 MMOL/L (ref 22–29)
CREAT SERPL-MCNC: 1.1 MG/DL (ref 0.5–1)
EOSINOPHILS ABSOLUTE: 0.24 E9/L (ref 0.05–0.5)
EOSINOPHILS RELATIVE PERCENT: 3.1 % (ref 0–6)
GFR AFRICAN AMERICAN: 57
GFR NON-AFRICAN AMERICAN: 47 ML/MIN/1.73
GLUCOSE BLD-MCNC: 102 MG/DL (ref 74–99)
HCT VFR BLD CALC: 41.4 % (ref 34–48)
HEMOGLOBIN: 13 G/DL (ref 11.5–15.5)
IMMATURE GRANULOCYTES #: 0.04 E9/L
IMMATURE GRANULOCYTES %: 0.5 % (ref 0–5)
LYMPHOCYTES ABSOLUTE: 1.92 E9/L (ref 1.5–4)
LYMPHOCYTES RELATIVE PERCENT: 25 % (ref 20–42)
MCH RBC QN AUTO: 24.5 PG (ref 26–35)
MCHC RBC AUTO-ENTMCNC: 31.4 % (ref 32–34.5)
MCV RBC AUTO: 78 FL (ref 80–99.9)
MONOCYTES ABSOLUTE: 0.87 E9/L (ref 0.1–0.95)
MONOCYTES RELATIVE PERCENT: 11.3 % (ref 2–12)
NEUTROPHILS ABSOLUTE: 4.56 E9/L (ref 1.8–7.3)
NEUTROPHILS RELATIVE PERCENT: 59.6 % (ref 43–80)
PDW BLD-RTO: 15.5 FL (ref 11.5–15)
PLATELET # BLD: 306 E9/L (ref 130–450)
PMV BLD AUTO: 9.6 FL (ref 7–12)
POTASSIUM SERPL-SCNC: 4 MMOL/L (ref 3.5–5)
RBC # BLD: 5.31 E12/L (ref 3.5–5.5)
SODIUM BLD-SCNC: 139 MMOL/L (ref 132–146)
TOTAL CK: 34 U/L (ref 20–180)
TROPONIN: <0.01 NG/ML (ref 0–0.03)
WBC # BLD: 7.7 E9/L (ref 4.5–11.5)

## 2019-09-15 PROCEDURE — 99284 EMERGENCY DEPT VISIT MOD MDM: CPT

## 2019-09-15 PROCEDURE — 70450 CT HEAD/BRAIN W/O DYE: CPT

## 2019-09-15 PROCEDURE — 80048 BASIC METABOLIC PNL TOTAL CA: CPT

## 2019-09-15 PROCEDURE — 84484 ASSAY OF TROPONIN QUANT: CPT

## 2019-09-15 PROCEDURE — 82550 ASSAY OF CK (CPK): CPT

## 2019-09-15 PROCEDURE — 93005 ELECTROCARDIOGRAM TRACING: CPT | Performed by: EMERGENCY MEDICINE

## 2019-09-15 PROCEDURE — 85025 COMPLETE CBC W/AUTO DIFF WBC: CPT

## 2019-09-15 PROCEDURE — 36415 COLL VENOUS BLD VENIPUNCTURE: CPT

## 2019-09-16 LAB
EKG ATRIAL RATE: 72 BPM
EKG P AXIS: 14 DEGREES
EKG P-R INTERVAL: 182 MS
EKG Q-T INTERVAL: 382 MS
EKG QRS DURATION: 80 MS
EKG QTC CALCULATION (BAZETT): 418 MS
EKG R AXIS: -8 DEGREES
EKG T AXIS: 19 DEGREES
EKG VENTRICULAR RATE: 72 BPM

## 2019-09-16 PROCEDURE — 93010 ELECTROCARDIOGRAM REPORT: CPT | Performed by: INTERNAL MEDICINE

## 2019-09-25 ENCOUNTER — OFFICE VISIT (OUTPATIENT)
Dept: PRIMARY CARE CLINIC | Age: 84
End: 2019-09-25
Payer: COMMERCIAL

## 2019-09-25 VITALS
HEIGHT: 60 IN | RESPIRATION RATE: 18 BRPM | WEIGHT: 124 LBS | BODY MASS INDEX: 24.35 KG/M2 | SYSTOLIC BLOOD PRESSURE: 136 MMHG | TEMPERATURE: 98.2 F | DIASTOLIC BLOOD PRESSURE: 70 MMHG | OXYGEN SATURATION: 95 % | HEART RATE: 67 BPM

## 2019-09-25 DIAGNOSIS — Z23 NEED FOR PROPHYLACTIC VACCINATION AND INOCULATION AGAINST INFLUENZA: ICD-10-CM

## 2019-09-25 DIAGNOSIS — M25.542 ARTHRALGIA OF LEFT HAND: ICD-10-CM

## 2019-09-25 DIAGNOSIS — I10 ESSENTIAL HYPERTENSION: Primary | ICD-10-CM

## 2019-09-25 PROCEDURE — 4040F PNEUMOC VAC/ADMIN/RCVD: CPT | Performed by: FAMILY MEDICINE

## 2019-09-25 PROCEDURE — 99213 OFFICE O/P EST LOW 20 MIN: CPT | Performed by: FAMILY MEDICINE

## 2019-09-25 PROCEDURE — 90653 IIV ADJUVANT VACCINE IM: CPT | Performed by: FAMILY MEDICINE

## 2019-09-25 PROCEDURE — 1123F ACP DISCUSS/DSCN MKR DOCD: CPT | Performed by: FAMILY MEDICINE

## 2019-09-25 PROCEDURE — 90471 IMMUNIZATION ADMIN: CPT | Performed by: FAMILY MEDICINE

## 2019-09-25 PROCEDURE — G8420 CALC BMI NORM PARAMETERS: HCPCS | Performed by: FAMILY MEDICINE

## 2019-09-25 PROCEDURE — G8400 PT W/DXA NO RESULTS DOC: HCPCS | Performed by: FAMILY MEDICINE

## 2019-09-25 PROCEDURE — G8427 DOCREV CUR MEDS BY ELIG CLIN: HCPCS | Performed by: FAMILY MEDICINE

## 2019-09-25 PROCEDURE — 1036F TOBACCO NON-USER: CPT | Performed by: FAMILY MEDICINE

## 2019-09-25 PROCEDURE — 1090F PRES/ABSN URINE INCON ASSESS: CPT | Performed by: FAMILY MEDICINE

## 2019-09-25 ASSESSMENT — ENCOUNTER SYMPTOMS
SINUS PRESSURE: 0
COLOR CHANGE: 0
SHORTNESS OF BREATH: 0
VOMITING: 0
ABDOMINAL PAIN: 0
BLOOD IN STOOL: 0
WHEEZING: 0
APNEA: 0
CHEST TIGHTNESS: 0
ALLERGIC/IMMUNOLOGIC NEGATIVE: 1
PHOTOPHOBIA: 0
BACK PAIN: 0
NAUSEA: 0
DIARRHEA: 0
FACIAL SWELLING: 0
SORE THROAT: 0
COUGH: 0

## 2019-09-25 NOTE — PATIENT INSTRUCTIONS
season. But even when the vaccine doesn't exactly match these viruses, it may still provide some protection. Flu vaccine cannot prevent:  · Flu that is caused by a virus not covered by the vaccine. · Illnesses that look like flu but are not. Some people should not get this vaccine  Tell the person who is giving you the vaccine:  · If you have any severe (life-threatening) allergies. If you ever had a life-threatening allergic reaction after a dose of flu vaccine, or have a severe allergy to any part of this vaccine, you may be advised not to get vaccinated. Most, but not all, types of flu vaccine contain a small amount of egg protein. · If you ever had Guillain-Barré syndrome (also called GBS) Some people with a history of GBS should not get this vaccine. This should be discussed with your doctor. · If you are not feeling well. It is usually okay to get flu vaccine when you have a mild illness, but you might be asked to come back when you feel better. Risks of a vaccine reaction  With any medicine, including vaccines, there is a chance of reactions. These are usually mild and go away on their own, but serious reactions are also possible. Most people who get a flu shot do not have any problems with it. Minor problems following a flu shot include:  · Soreness, redness, or swelling where the shot was given  · Hoarseness  · Sore, red or itchy eyes  · Cough  · Fever  · Aches  · Headache  · Itching  · Fatigue  If these problems occur, they usually begin soon after the shot and last 1 or 2 days. More serious problems following a flu shot can include the following:  · There may be a small increased risk of Guillain-Barré Syndrome (GBS) after inactivated flu vaccine. This risk has been estimated at 1 or 2 additional cases per million people vaccinated. This is much lower than the risk of severe complications from flu, which can be prevented by flu vaccine.   · Jevon Crenshaw children who get the flu shot along with [phone]. [facility] does not give medical advice. The National Vaccine Injury Compensation Program  The National Vaccine Injury Compensation Program (VICP) is a federal program that was created to compensate people who may have been injured by certain vaccines. Persons who believe they may have been injured by a vaccine can learn about the program and about filing a claim by calling 3-683.736.9429 or visiting the [employer] website at www.[facility].gov/vaccinecompensation. There is a time limit to file a claim for compensation. How can I learn more? · Ask your healthcare provider. He or she can give you the vaccine package insert or suggest other sources of information. · Call your local or state health department. · Contact the Centers for Disease Control and Prevention (CDC):  ? Call 0-642.359.9502 (1-800-CDC-INFO) or  ? Visit CDC's website at www.cdc.gov/flu  Vaccine Information Statement  Inactivated Influenza Vaccine  8/7/2015)  [address] 908HZ-96  Department of Health and Human Services  Centers for Disease Control and Prevention  Many Vaccine Information Statements are available in [place] and other languages. See www.immunize.org/vis. Muchas hojas de información sobre vacunas están disponibles en español y en otros idiomas. Visite www.immunize.org/vis. Care instructions adapted under license by [facility] ([facility]). If you have questions about a medical condition or this instruction, always ask your healthcare professional. [place] [address] any warranty or liability for your use of this information.

## 2019-09-30 RX ORDER — AMLODIPINE BESYLATE 5 MG/1
TABLET ORAL
Qty: 30 TABLET | Refills: 0 | Status: SHIPPED | OUTPATIENT
Start: 2019-09-30 | End: 2019-11-01 | Stop reason: SDUPTHER

## 2019-10-07 DIAGNOSIS — F51.01 PRIMARY INSOMNIA: ICD-10-CM

## 2019-10-07 RX ORDER — TRAZODONE HYDROCHLORIDE 50 MG/1
50 TABLET ORAL NIGHTLY
Qty: 30 TABLET | Refills: 2 | Status: SHIPPED
Start: 2019-10-07 | End: 2020-01-01 | Stop reason: SDUPTHER

## 2019-10-23 ENCOUNTER — TELEPHONE (OUTPATIENT)
Dept: ORTHOPEDIC SURGERY | Age: 84
End: 2019-10-23

## 2019-10-23 DIAGNOSIS — M79.641 RIGHT HAND PAIN: Primary | ICD-10-CM

## 2019-11-01 RX ORDER — AMLODIPINE BESYLATE 5 MG/1
TABLET ORAL
Qty: 30 TABLET | Refills: 0 | Status: SHIPPED | OUTPATIENT
Start: 2019-11-01 | End: 2019-11-12 | Stop reason: SDUPTHER

## 2019-11-12 ENCOUNTER — OFFICE VISIT (OUTPATIENT)
Dept: PRIMARY CARE CLINIC | Age: 84
End: 2019-11-12
Payer: COMMERCIAL

## 2019-11-12 ENCOUNTER — TELEPHONE (OUTPATIENT)
Dept: PRIMARY CARE CLINIC | Age: 84
End: 2019-11-12

## 2019-11-12 VITALS
DIASTOLIC BLOOD PRESSURE: 64 MMHG | OXYGEN SATURATION: 93 % | RESPIRATION RATE: 16 BRPM | HEART RATE: 83 BPM | BODY MASS INDEX: 24.54 KG/M2 | WEIGHT: 125 LBS | HEIGHT: 60 IN | SYSTOLIC BLOOD PRESSURE: 128 MMHG

## 2019-11-12 DIAGNOSIS — R05.9 COUGH: Primary | ICD-10-CM

## 2019-11-12 DIAGNOSIS — J20.9 ACUTE BRONCHITIS, UNSPECIFIED ORGANISM: ICD-10-CM

## 2019-11-12 PROCEDURE — G8482 FLU IMMUNIZE ORDER/ADMIN: HCPCS | Performed by: FAMILY MEDICINE

## 2019-11-12 PROCEDURE — G8420 CALC BMI NORM PARAMETERS: HCPCS | Performed by: FAMILY MEDICINE

## 2019-11-12 PROCEDURE — 1123F ACP DISCUSS/DSCN MKR DOCD: CPT | Performed by: FAMILY MEDICINE

## 2019-11-12 PROCEDURE — 99213 OFFICE O/P EST LOW 20 MIN: CPT | Performed by: FAMILY MEDICINE

## 2019-11-12 PROCEDURE — G8427 DOCREV CUR MEDS BY ELIG CLIN: HCPCS | Performed by: FAMILY MEDICINE

## 2019-11-12 PROCEDURE — 4040F PNEUMOC VAC/ADMIN/RCVD: CPT | Performed by: FAMILY MEDICINE

## 2019-11-12 PROCEDURE — 1036F TOBACCO NON-USER: CPT | Performed by: FAMILY MEDICINE

## 2019-11-12 PROCEDURE — G8400 PT W/DXA NO RESULTS DOC: HCPCS | Performed by: FAMILY MEDICINE

## 2019-11-12 PROCEDURE — 1090F PRES/ABSN URINE INCON ASSESS: CPT | Performed by: FAMILY MEDICINE

## 2019-11-12 RX ORDER — BENZONATATE 100 MG/1
100 CAPSULE ORAL 3 TIMES DAILY PRN
Qty: 30 CAPSULE | Refills: 0 | Status: SHIPPED
Start: 2019-11-12 | End: 2020-01-01 | Stop reason: SDUPTHER

## 2019-11-12 RX ORDER — AMLODIPINE BESYLATE 5 MG/1
5 TABLET ORAL DAILY
Qty: 30 TABLET | Refills: 5 | Status: SHIPPED
Start: 2019-11-12 | End: 2020-01-01

## 2019-11-12 RX ORDER — AZITHROMYCIN 250 MG/1
TABLET, FILM COATED ORAL
Qty: 6 TABLET | Refills: 0 | Status: SHIPPED
Start: 2019-11-12 | End: 2020-01-01

## 2019-11-12 ASSESSMENT — ENCOUNTER SYMPTOMS
SORE THROAT: 0
FACIAL SWELLING: 0
COLOR CHANGE: 0
BLOOD IN STOOL: 0
ABDOMINAL PAIN: 0
PHOTOPHOBIA: 0
COUGH: 1
BACK PAIN: 0
ALLERGIC/IMMUNOLOGIC NEGATIVE: 1
DIARRHEA: 0
SHORTNESS OF BREATH: 0
CHEST TIGHTNESS: 0
SINUS PRESSURE: 0
WHEEZING: 0
APNEA: 0
NAUSEA: 0
VOMITING: 0

## 2020-01-01 ENCOUNTER — OFFICE VISIT (OUTPATIENT)
Dept: PRIMARY CARE CLINIC | Age: 85
End: 2020-01-01
Payer: COMMERCIAL

## 2020-01-01 ENCOUNTER — APPOINTMENT (OUTPATIENT)
Dept: GENERAL RADIOLOGY | Age: 85
DRG: 137 | End: 2020-01-01
Payer: COMMERCIAL

## 2020-01-01 ENCOUNTER — TELEPHONE (OUTPATIENT)
Dept: PRIMARY CARE CLINIC | Age: 85
End: 2020-01-01

## 2020-01-01 ENCOUNTER — OFFICE VISIT (OUTPATIENT)
Dept: PODIATRY | Age: 85
End: 2020-01-01
Payer: COMMERCIAL

## 2020-01-01 ENCOUNTER — HOSPITAL ENCOUNTER (OUTPATIENT)
Dept: GENERAL RADIOLOGY | Age: 85
Discharge: HOME | DRG: 137 | End: 2020-11-26
Payer: COMMERCIAL

## 2020-01-01 ENCOUNTER — APPOINTMENT (OUTPATIENT)
Dept: CT IMAGING | Age: 85
DRG: 137 | End: 2020-01-01
Payer: COMMERCIAL

## 2020-01-01 ENCOUNTER — HOSPITAL ENCOUNTER (INPATIENT)
Age: 85
LOS: 11 days | Discharge: HOSPICE | DRG: 137 | End: 2020-12-08
Attending: EMERGENCY MEDICINE | Admitting: INTERNAL MEDICINE
Payer: COMMERCIAL

## 2020-01-01 ENCOUNTER — HOSPITAL ENCOUNTER (OUTPATIENT)
Age: 85
Discharge: HOME | DRG: 137 | End: 2020-11-24
Payer: COMMERCIAL

## 2020-01-01 ENCOUNTER — EVALUATION (OUTPATIENT)
Dept: PHYSICAL THERAPY | Age: 85
End: 2020-01-01
Payer: COMMERCIAL

## 2020-01-01 ENCOUNTER — TELEPHONE (OUTPATIENT)
Dept: ADMINISTRATIVE | Age: 85
End: 2020-01-01

## 2020-01-01 ENCOUNTER — HOSPITAL ENCOUNTER (OUTPATIENT)
Age: 85
Discharge: HOME | DRG: 137 | End: 2020-11-26
Payer: COMMERCIAL

## 2020-01-01 ENCOUNTER — TREATMENT (OUTPATIENT)
Dept: PHYSICAL THERAPY | Age: 85
End: 2020-01-01
Payer: COMMERCIAL

## 2020-01-01 VITALS
SYSTOLIC BLOOD PRESSURE: 125 MMHG | DIASTOLIC BLOOD PRESSURE: 59 MMHG | WEIGHT: 111.99 LBS | OXYGEN SATURATION: 85 % | HEART RATE: 69 BPM | HEIGHT: 62 IN | TEMPERATURE: 99.3 F | RESPIRATION RATE: 35 BRPM | BODY MASS INDEX: 20.61 KG/M2

## 2020-01-01 VITALS
HEART RATE: 64 BPM | SYSTOLIC BLOOD PRESSURE: 138 MMHG | HEIGHT: 60 IN | OXYGEN SATURATION: 95 % | RESPIRATION RATE: 18 BRPM | WEIGHT: 128 LBS | TEMPERATURE: 98 F | BODY MASS INDEX: 25.13 KG/M2 | DIASTOLIC BLOOD PRESSURE: 78 MMHG

## 2020-01-01 VITALS
HEIGHT: 60 IN | SYSTOLIC BLOOD PRESSURE: 110 MMHG | WEIGHT: 126 LBS | BODY MASS INDEX: 24.74 KG/M2 | DIASTOLIC BLOOD PRESSURE: 70 MMHG | HEART RATE: 82 BPM | RESPIRATION RATE: 18 BRPM | TEMPERATURE: 97.1 F | OXYGEN SATURATION: 97 %

## 2020-01-01 VITALS
WEIGHT: 117 LBS | OXYGEN SATURATION: 97 % | BODY MASS INDEX: 22.97 KG/M2 | RESPIRATION RATE: 16 BRPM | HEIGHT: 60 IN | SYSTOLIC BLOOD PRESSURE: 126 MMHG | TEMPERATURE: 97.3 F | DIASTOLIC BLOOD PRESSURE: 64 MMHG | HEART RATE: 67 BPM

## 2020-01-01 VITALS
WEIGHT: 116 LBS | TEMPERATURE: 97.7 F | BODY MASS INDEX: 22.78 KG/M2 | DIASTOLIC BLOOD PRESSURE: 70 MMHG | RESPIRATION RATE: 16 BRPM | HEIGHT: 60 IN | SYSTOLIC BLOOD PRESSURE: 138 MMHG | OXYGEN SATURATION: 98 % | HEART RATE: 78 BPM

## 2020-01-01 VITALS
BODY MASS INDEX: 24.02 KG/M2 | WEIGHT: 123 LBS | DIASTOLIC BLOOD PRESSURE: 72 MMHG | OXYGEN SATURATION: 98 % | TEMPERATURE: 97 F | HEART RATE: 89 BPM | SYSTOLIC BLOOD PRESSURE: 124 MMHG

## 2020-01-01 VITALS
TEMPERATURE: 98.1 F | RESPIRATION RATE: 16 BRPM | SYSTOLIC BLOOD PRESSURE: 116 MMHG | WEIGHT: 126 LBS | OXYGEN SATURATION: 96 % | BODY MASS INDEX: 24.74 KG/M2 | HEIGHT: 60 IN | DIASTOLIC BLOOD PRESSURE: 62 MMHG | HEART RATE: 74 BPM

## 2020-01-01 VITALS
HEART RATE: 89 BPM | DIASTOLIC BLOOD PRESSURE: 72 MMHG | TEMPERATURE: 98.2 F | RESPIRATION RATE: 20 BRPM | OXYGEN SATURATION: 95 % | WEIGHT: 117 LBS | BODY MASS INDEX: 22.85 KG/M2 | SYSTOLIC BLOOD PRESSURE: 134 MMHG

## 2020-01-01 DIAGNOSIS — Z20.822 SUSPECTED COVID-19 VIRUS INFECTION: ICD-10-CM

## 2020-01-01 LAB
AADO2: 596 MMHG
ALBUMIN SERPL-MCNC: 2 G/DL (ref 3.5–5.2)
ALBUMIN SERPL-MCNC: 2 G/DL (ref 3.5–5.2)
ALBUMIN SERPL-MCNC: 2.1 G/DL (ref 3.5–5.2)
ALBUMIN SERPL-MCNC: 2.1 G/DL (ref 3.5–5.2)
ALBUMIN SERPL-MCNC: 2.3 G/DL (ref 3.5–5.2)
ALBUMIN SERPL-MCNC: 2.3 G/DL (ref 3.5–5.2)
ALBUMIN SERPL-MCNC: 2.8 G/DL (ref 3.5–5.2)
ALBUMIN SERPL-MCNC: 3.3 G/DL (ref 3.5–5.2)
ALP BLD-CCNC: 102 U/L (ref 35–104)
ALP BLD-CCNC: 69 U/L (ref 35–104)
ALP BLD-CCNC: 71 U/L (ref 35–104)
ALP BLD-CCNC: 72 U/L (ref 35–104)
ALP BLD-CCNC: 73 U/L (ref 35–104)
ALP BLD-CCNC: 73 U/L (ref 35–104)
ALP BLD-CCNC: 74 U/L (ref 35–104)
ALP BLD-CCNC: 82 U/L (ref 35–104)
ALT SERPL-CCNC: 16 U/L (ref 0–32)
ALT SERPL-CCNC: 18 U/L (ref 0–32)
ALT SERPL-CCNC: 18 U/L (ref 0–32)
ALT SERPL-CCNC: 19 U/L (ref 0–32)
ALT SERPL-CCNC: 22 U/L (ref 0–32)
ALT SERPL-CCNC: 26 U/L (ref 0–32)
ALT SERPL-CCNC: 27 U/L (ref 0–32)
ALT SERPL-CCNC: 29 U/L (ref 0–32)
ANION GAP SERPL CALCULATED.3IONS-SCNC: 0 MMOL/L (ref 7–16)
ANION GAP SERPL CALCULATED.3IONS-SCNC: 10 MMOL/L (ref 7–16)
ANION GAP SERPL CALCULATED.3IONS-SCNC: 11 MMOL/L (ref 7–16)
ANION GAP SERPL CALCULATED.3IONS-SCNC: 4 MMOL/L (ref 7–16)
ANION GAP SERPL CALCULATED.3IONS-SCNC: 5 MMOL/L (ref 7–16)
ANION GAP SERPL CALCULATED.3IONS-SCNC: 6 MMOL/L (ref 7–16)
ANION GAP SERPL CALCULATED.3IONS-SCNC: 8 MMOL/L (ref 7–16)
ANION GAP SERPL CALCULATED.3IONS-SCNC: 9 MMOL/L (ref 7–16)
ANION GAP SERPL CALCULATED.3IONS-SCNC: 9 MMOL/L (ref 7–16)
ANISOCYTOSIS: ABNORMAL
APTT: 21.4 SEC (ref 24.5–35.1)
APTT: 21.7 SEC (ref 24.5–35.1)
APTT: 26 SEC (ref 24.5–35.1)
APTT: 30.3 SEC (ref 24.5–35.1)
APTT: 47.2 SEC (ref 24.5–35.1)
APTT: 67 SEC (ref 24.5–35.1)
AST SERPL-CCNC: 18 U/L (ref 0–31)
AST SERPL-CCNC: 18 U/L (ref 0–31)
AST SERPL-CCNC: 19 U/L (ref 0–31)
AST SERPL-CCNC: 22 U/L (ref 0–31)
AST SERPL-CCNC: 22 U/L (ref 0–31)
AST SERPL-CCNC: 24 U/L (ref 0–31)
AST SERPL-CCNC: 26 U/L (ref 0–31)
AST SERPL-CCNC: 27 U/L (ref 0–31)
B.E.: -6 MMOL/L (ref -3–3)
B.E.: 4.2 MMOL/L (ref -3–3)
BACTERIA: ABNORMAL /HPF
BASOPHILS ABSOLUTE: 0 E9/L (ref 0–0.2)
BASOPHILS ABSOLUTE: 0.01 E9/L (ref 0–0.2)
BASOPHILS ABSOLUTE: 0.01 E9/L (ref 0–0.2)
BASOPHILS ABSOLUTE: 0.02 E9/L (ref 0–0.2)
BASOPHILS ABSOLUTE: 0.02 E9/L (ref 0–0.2)
BASOPHILS RELATIVE PERCENT: 0 % (ref 0–2)
BASOPHILS RELATIVE PERCENT: 0.1 % (ref 0–2)
BASOPHILS RELATIVE PERCENT: 0.3 % (ref 0–2)
BILIRUB SERPL-MCNC: 0.2 MG/DL (ref 0–1.2)
BILIRUB SERPL-MCNC: 0.3 MG/DL (ref 0–1.2)
BILIRUB SERPL-MCNC: 0.4 MG/DL (ref 0–1.2)
BILIRUB SERPL-MCNC: <0.2 MG/DL (ref 0–1.2)
BILIRUBIN DIRECT: <0.2 MG/DL (ref 0–0.3)
BILIRUBIN URINE: NEGATIVE
BILIRUBIN, INDIRECT: NORMAL MG/DL (ref 0–1)
BLOOD, URINE: NEGATIVE
BUN BLDV-MCNC: 18 MG/DL (ref 8–23)
BUN BLDV-MCNC: 22 MG/DL (ref 8–23)
BUN BLDV-MCNC: 23 MG/DL (ref 8–23)
BUN BLDV-MCNC: 27 MG/DL (ref 8–23)
BUN BLDV-MCNC: 27 MG/DL (ref 8–23)
BUN BLDV-MCNC: 28 MG/DL (ref 8–23)
BUN BLDV-MCNC: 31 MG/DL (ref 8–23)
BUN BLDV-MCNC: 34 MG/DL (ref 8–23)
BUN BLDV-MCNC: 36 MG/DL (ref 8–23)
BURR CELLS: ABNORMAL
BURR CELLS: ABNORMAL
C-REACTIVE PROTEIN: 10.7 MG/DL (ref 0–0.4)
C-REACTIVE PROTEIN: 6 MG/DL (ref 0–0.4)
C-REACTIVE PROTEIN: 9.1 MG/DL (ref 0–0.4)
CALCIUM SERPL-MCNC: 8.4 MG/DL (ref 8.6–10.2)
CALCIUM SERPL-MCNC: 8.5 MG/DL (ref 8.6–10.2)
CALCIUM SERPL-MCNC: 8.6 MG/DL (ref 8.6–10.2)
CALCIUM SERPL-MCNC: 8.8 MG/DL (ref 8.6–10.2)
CALCIUM SERPL-MCNC: 8.8 MG/DL (ref 8.6–10.2)
CALCIUM SERPL-MCNC: 8.9 MG/DL (ref 8.6–10.2)
CHLORIDE BLD-SCNC: 100 MMOL/L (ref 98–107)
CHLORIDE BLD-SCNC: 100 MMOL/L (ref 98–107)
CHLORIDE BLD-SCNC: 103 MMOL/L (ref 98–107)
CHLORIDE BLD-SCNC: 104 MMOL/L (ref 98–107)
CHLORIDE BLD-SCNC: 105 MMOL/L (ref 98–107)
CHLORIDE BLD-SCNC: 108 MMOL/L (ref 98–107)
CLARITY: CLEAR
CO2: 22 MMOL/L (ref 22–29)
CO2: 22 MMOL/L (ref 22–29)
CO2: 25 MMOL/L (ref 22–29)
CO2: 26 MMOL/L (ref 22–29)
CO2: 26 MMOL/L (ref 22–29)
CO2: 28 MMOL/L (ref 22–29)
CO2: 29 MMOL/L (ref 22–29)
CO2: 29 MMOL/L (ref 22–29)
CO2: 31 MMOL/L (ref 22–29)
COHB: 0.5 % (ref 0–1.5)
COHB: 0.6 % (ref 0–1.5)
COLOR: YELLOW
CREAT SERPL-MCNC: 0.9 MG/DL (ref 0.5–1)
CREAT SERPL-MCNC: 0.9 MG/DL (ref 0.5–1)
CREAT SERPL-MCNC: 1 MG/DL (ref 0.5–1)
CREAT SERPL-MCNC: 1.1 MG/DL (ref 0.5–1)
CREAT SERPL-MCNC: 1.1 MG/DL (ref 0.5–1)
CRITICAL: ABNORMAL
CRITICAL: ABNORMAL
D DIMER: 2289 NG/ML DDU
D DIMER: 2679 NG/ML DDU
D DIMER: 524 NG/ML DDU
D DIMER: 647 NG/ML DDU
D DIMER: 875 NG/ML DDU
DATE ANALYZED: ABNORMAL
DATE ANALYZED: ABNORMAL
DATE OF COLLECTION: ABNORMAL
DATE OF COLLECTION: ABNORMAL
EKG ATRIAL RATE: 69 BPM
EKG ATRIAL RATE: 78 BPM
EKG P AXIS: 34 DEGREES
EKG P AXIS: 35 DEGREES
EKG P-R INTERVAL: 160 MS
EKG P-R INTERVAL: 172 MS
EKG Q-T INTERVAL: 368 MS
EKG Q-T INTERVAL: 370 MS
EKG QRS DURATION: 70 MS
EKG QRS DURATION: 82 MS
EKG QTC CALCULATION (BAZETT): 396 MS
EKG QTC CALCULATION (BAZETT): 419 MS
EKG R AXIS: -3 DEGREES
EKG R AXIS: -7 DEGREES
EKG T AXIS: 15 DEGREES
EKG T AXIS: 18 DEGREES
EKG VENTRICULAR RATE: 69 BPM
EKG VENTRICULAR RATE: 78 BPM
EOSINOPHILS ABSOLUTE: 0 E9/L (ref 0.05–0.5)
EOSINOPHILS ABSOLUTE: 0.01 E9/L (ref 0.05–0.5)
EOSINOPHILS ABSOLUTE: 0.02 E9/L (ref 0.05–0.5)
EOSINOPHILS RELATIVE PERCENT: 0 % (ref 0–6)
EOSINOPHILS RELATIVE PERCENT: 0.1 % (ref 0–6)
EOSINOPHILS RELATIVE PERCENT: 0.3 % (ref 0–6)
EPITHELIAL CELLS, UA: ABNORMAL /HPF
FERRITIN: 1033 NG/ML
FERRITIN: 289 NG/ML
FERRITIN: 296 NG/ML
FIBRINOGEN: >700 MG/DL (ref 225–540)
FIBRINOGEN: >700 MG/DL (ref 225–540)
FIO2: 100 %
GFR AFRICAN AMERICAN: 57
GFR AFRICAN AMERICAN: 57
GFR AFRICAN AMERICAN: >60
GFR NON-AFRICAN AMERICAN: 47 ML/MIN/1.73
GFR NON-AFRICAN AMERICAN: 47 ML/MIN/1.73
GFR NON-AFRICAN AMERICAN: 52 ML/MIN/1.73
GFR NON-AFRICAN AMERICAN: 59 ML/MIN/1.73
GFR NON-AFRICAN AMERICAN: 59 ML/MIN/1.73
GLUCOSE BLD-MCNC: 130 MG/DL (ref 74–99)
GLUCOSE BLD-MCNC: 132 MG/DL (ref 74–99)
GLUCOSE BLD-MCNC: 136 MG/DL (ref 74–99)
GLUCOSE BLD-MCNC: 138 MG/DL (ref 74–99)
GLUCOSE BLD-MCNC: 146 MG/DL (ref 74–99)
GLUCOSE BLD-MCNC: 148 MG/DL (ref 74–99)
GLUCOSE BLD-MCNC: 172 MG/DL (ref 74–99)
GLUCOSE BLD-MCNC: 172 MG/DL (ref 74–99)
GLUCOSE BLD-MCNC: 265 MG/DL (ref 74–99)
GLUCOSE URINE: NEGATIVE MG/DL
HCO3: 16.8 MMOL/L (ref 22–26)
HCO3: 27.8 MMOL/L (ref 22–26)
HCT VFR BLD CALC: 29.4 % (ref 34–48)
HCT VFR BLD CALC: 32.6 % (ref 34–48)
HCT VFR BLD CALC: 32.7 % (ref 34–48)
HCT VFR BLD CALC: 33.3 % (ref 34–48)
HCT VFR BLD CALC: 33.4 % (ref 34–48)
HCT VFR BLD CALC: 35.5 % (ref 34–48)
HCT VFR BLD CALC: 36.1 % (ref 34–48)
HCT VFR BLD CALC: 36.5 % (ref 34–48)
HCT VFR BLD CALC: 37.1 % (ref 34–48)
HCT VFR BLD CALC: 37.9 % (ref 34–48)
HEMOGLOBIN: 10.5 G/DL (ref 11.5–15.5)
HEMOGLOBIN: 10.6 G/DL (ref 11.5–15.5)
HEMOGLOBIN: 10.7 G/DL (ref 11.5–15.5)
HEMOGLOBIN: 11 G/DL (ref 11.5–15.5)
HEMOGLOBIN: 11.4 G/DL (ref 11.5–15.5)
HEMOGLOBIN: 11.5 G/DL (ref 11.5–15.5)
HEMOGLOBIN: 11.5 G/DL (ref 11.5–15.5)
HEMOGLOBIN: 11.6 G/DL (ref 11.5–15.5)
HEMOGLOBIN: 11.9 G/DL (ref 11.5–15.5)
HEMOGLOBIN: 9.4 G/DL (ref 11.5–15.5)
HHB: 11.4 % (ref 0–5)
HHB: 4 % (ref 0–5)
HYPOCHROMIA: ABNORMAL
IMMATURE GRANULOCYTES #: 0.03 E9/L
IMMATURE GRANULOCYTES #: 0.04 E9/L
IMMATURE GRANULOCYTES #: 0.04 E9/L
IMMATURE GRANULOCYTES #: 0.06 E9/L
IMMATURE GRANULOCYTES #: 0.06 E9/L
IMMATURE GRANULOCYTES #: 0.24 E9/L
IMMATURE GRANULOCYTES %: 0.4 % (ref 0–5)
IMMATURE GRANULOCYTES %: 0.5 % (ref 0–5)
IMMATURE GRANULOCYTES %: 1.2 % (ref 0–5)
INR BLD: 0.9
INR BLD: 1.1
KETONES, URINE: NEGATIVE MG/DL
LAB: ABNORMAL
LAB: ABNORMAL
LACTATE DEHYDROGENASE: 323 U/L (ref 135–214)
LACTATE DEHYDROGENASE: 421 U/L (ref 135–214)
LACTATE DEHYDROGENASE: 750 U/L (ref 135–214)
LEUKOCYTE ESTERASE, URINE: NEGATIVE
LYMPHOCYTES ABSOLUTE: 0.36 E9/L (ref 1.5–4)
LYMPHOCYTES ABSOLUTE: 0.4 E9/L (ref 1.5–4)
LYMPHOCYTES ABSOLUTE: 0.45 E9/L (ref 1.5–4)
LYMPHOCYTES ABSOLUTE: 0.46 E9/L (ref 1.5–4)
LYMPHOCYTES ABSOLUTE: 0.52 E9/L (ref 1.5–4)
LYMPHOCYTES ABSOLUTE: 0.57 E9/L (ref 1.5–4)
LYMPHOCYTES ABSOLUTE: 0.92 E9/L (ref 1.5–4)
LYMPHOCYTES RELATIVE PERCENT: 1.8 % (ref 20–42)
LYMPHOCYTES RELATIVE PERCENT: 13.3 % (ref 20–42)
LYMPHOCYTES RELATIVE PERCENT: 2.6 % (ref 20–42)
LYMPHOCYTES RELATIVE PERCENT: 3.3 % (ref 20–42)
LYMPHOCYTES RELATIVE PERCENT: 3.9 % (ref 20–42)
LYMPHOCYTES RELATIVE PERCENT: 5.7 % (ref 20–42)
LYMPHOCYTES RELATIVE PERCENT: 6.2 % (ref 20–42)
Lab: ABNORMAL
Lab: ABNORMAL
Lab: NORMAL
MCH RBC QN AUTO: 24.7 PG (ref 26–35)
MCH RBC QN AUTO: 24.9 PG (ref 26–35)
MCH RBC QN AUTO: 25.1 PG (ref 26–35)
MCH RBC QN AUTO: 25.1 PG (ref 26–35)
MCH RBC QN AUTO: 25.2 PG (ref 26–35)
MCH RBC QN AUTO: 25.2 PG (ref 26–35)
MCH RBC QN AUTO: 25.5 PG (ref 26–35)
MCH RBC QN AUTO: 25.5 PG (ref 26–35)
MCH RBC QN AUTO: 25.7 PG (ref 26–35)
MCH RBC QN AUTO: 25.8 PG (ref 26–35)
MCHC RBC AUTO-ENTMCNC: 30.6 % (ref 32–34.5)
MCHC RBC AUTO-ENTMCNC: 31.2 % (ref 32–34.5)
MCHC RBC AUTO-ENTMCNC: 31.5 % (ref 32–34.5)
MCHC RBC AUTO-ENTMCNC: 31.9 % (ref 32–34.5)
MCHC RBC AUTO-ENTMCNC: 32 % (ref 32–34.5)
MCHC RBC AUTO-ENTMCNC: 32.1 % (ref 32–34.5)
MCHC RBC AUTO-ENTMCNC: 32.4 % (ref 32–34.5)
MCHC RBC AUTO-ENTMCNC: 32.4 % (ref 32–34.5)
MCHC RBC AUTO-ENTMCNC: 32.8 % (ref 32–34.5)
MCHC RBC AUTO-ENTMCNC: 32.9 % (ref 32–34.5)
MCV RBC AUTO: 77.3 FL (ref 80–99.9)
MCV RBC AUTO: 77.6 FL (ref 80–99.9)
MCV RBC AUTO: 77.7 FL (ref 80–99.9)
MCV RBC AUTO: 77.8 FL (ref 80–99.9)
MCV RBC AUTO: 78.1 FL (ref 80–99.9)
MCV RBC AUTO: 78.9 FL (ref 80–99.9)
MCV RBC AUTO: 79.2 FL (ref 80–99.9)
MCV RBC AUTO: 80.2 FL (ref 80–99.9)
MCV RBC AUTO: 80.5 FL (ref 80–99.9)
MCV RBC AUTO: 82.4 FL (ref 80–99.9)
METHB: 0.3 % (ref 0–1.5)
METHB: 0.3 % (ref 0–1.5)
MODE: ABNORMAL
MODE: ABNORMAL
MONOCYTES ABSOLUTE: 0.09 E9/L (ref 0.1–0.95)
MONOCYTES ABSOLUTE: 0.29 E9/L (ref 0.1–0.95)
MONOCYTES ABSOLUTE: 0.29 E9/L (ref 0.1–0.95)
MONOCYTES ABSOLUTE: 0.35 E9/L (ref 0.1–0.95)
MONOCYTES ABSOLUTE: 0.36 E9/L (ref 0.1–0.95)
MONOCYTES ABSOLUTE: 0.42 E9/L (ref 0.1–0.95)
MONOCYTES ABSOLUTE: 0.57 E9/L (ref 0.1–0.95)
MONOCYTES RELATIVE PERCENT: 0.7 % (ref 2–12)
MONOCYTES RELATIVE PERCENT: 2.2 % (ref 2–12)
MONOCYTES RELATIVE PERCENT: 2.6 % (ref 2–12)
MONOCYTES RELATIVE PERCENT: 3 % (ref 2–12)
MONOCYTES RELATIVE PERCENT: 3.5 % (ref 2–12)
MONOCYTES RELATIVE PERCENT: 3.6 % (ref 2–12)
MONOCYTES RELATIVE PERCENT: 5.2 % (ref 2–12)
MRSA CULTURE ONLY: NORMAL
MUCUS: PRESENT /LPF
NEUTROPHILS ABSOLUTE: 10.76 E9/L (ref 1.8–7.3)
NEUTROPHILS ABSOLUTE: 11.67 E9/L (ref 1.8–7.3)
NEUTROPHILS ABSOLUTE: 17.96 E9/L (ref 1.8–7.3)
NEUTROPHILS ABSOLUTE: 18.42 E9/L (ref 1.8–7.3)
NEUTROPHILS ABSOLUTE: 5.57 E9/L (ref 1.8–7.3)
NEUTROPHILS ABSOLUTE: 7.22 E9/L (ref 1.8–7.3)
NEUTROPHILS ABSOLUTE: 7.54 E9/L (ref 1.8–7.3)
NEUTROPHILS RELATIVE PERCENT: 80.5 % (ref 43–80)
NEUTROPHILS RELATIVE PERCENT: 89.7 % (ref 43–80)
NEUTROPHILS RELATIVE PERCENT: 90.2 % (ref 43–80)
NEUTROPHILS RELATIVE PERCENT: 92.4 % (ref 43–80)
NEUTROPHILS RELATIVE PERCENT: 94.7 % (ref 43–80)
NEUTROPHILS RELATIVE PERCENT: 94.8 % (ref 43–80)
NEUTROPHILS RELATIVE PERCENT: 95.3 % (ref 43–80)
NITRITE, URINE: NEGATIVE
NUCLEATED RED BLOOD CELLS: 0 /100 WBC
O2 CONTENT: 14.2 ML/DL
O2 CONTENT: 15.6 ML/DL
O2 SATURATION: 88.5 % (ref 92–98.5)
O2 SATURATION: 96 % (ref 92–98.5)
O2HB: 87.8 % (ref 94–97)
O2HB: 95.1 % (ref 94–97)
OPERATOR ID: 3342
OPERATOR ID: 3342
OVALOCYTES: ABNORMAL
PATIENT TEMP: 37 C
PATIENT TEMP: 37 C
PCO2: 25.6 MMHG (ref 35–45)
PCO2: 38.1 MMHG (ref 35–45)
PDW BLD-RTO: 14.4 FL (ref 11.5–15)
PDW BLD-RTO: 14.6 FL (ref 11.5–15)
PDW BLD-RTO: 15.1 FL (ref 11.5–15)
PDW BLD-RTO: 15.2 FL (ref 11.5–15)
PDW BLD-RTO: 15.2 FL (ref 11.5–15)
PDW BLD-RTO: 15.4 FL (ref 11.5–15)
PDW BLD-RTO: 15.7 FL (ref 11.5–15)
PDW BLD-RTO: 16.7 FL (ref 11.5–15)
PFO2: 0.54 MMHG/%
PH BLOOD GAS: 7.43 (ref 7.35–7.45)
PH BLOOD GAS: 7.48 (ref 7.35–7.45)
PH UA: 6 (ref 5–9)
PLATELET # BLD: 129 E9/L (ref 130–450)
PLATELET # BLD: 152 E9/L (ref 130–450)
PLATELET # BLD: 175 E9/L (ref 130–450)
PLATELET # BLD: 190 E9/L (ref 130–450)
PLATELET # BLD: 203 E9/L (ref 130–450)
PLATELET # BLD: 223 E9/L (ref 130–450)
PLATELET # BLD: 248 E9/L (ref 130–450)
PLATELET # BLD: 280 E9/L (ref 130–450)
PLATELET # BLD: 286 E9/L (ref 130–450)
PLATELET # BLD: 287 E9/L (ref 130–450)
PMV BLD AUTO: 10.3 FL (ref 7–12)
PMV BLD AUTO: 10.3 FL (ref 7–12)
PMV BLD AUTO: 10.6 FL (ref 7–12)
PMV BLD AUTO: 10.8 FL (ref 7–12)
PMV BLD AUTO: 10.8 FL (ref 7–12)
PMV BLD AUTO: 11 FL (ref 7–12)
PMV BLD AUTO: 11.2 FL (ref 7–12)
PMV BLD AUTO: ABNORMAL FL (ref 7–12)
PO2: 53.9 MMHG (ref 75–100)
PO2: 84.1 MMHG (ref 75–100)
POIKILOCYTES: ABNORMAL
POLYCHROMASIA: ABNORMAL
POTASSIUM REFLEX MAGNESIUM: 4.5 MMOL/L (ref 3.5–5)
POTASSIUM SERPL-SCNC: 3.8 MMOL/L (ref 3.5–5)
POTASSIUM SERPL-SCNC: 4.1 MMOL/L (ref 3.5–5)
POTASSIUM SERPL-SCNC: 4.2 MMOL/L (ref 3.5–5)
POTASSIUM SERPL-SCNC: 4.2 MMOL/L (ref 3.5–5)
POTASSIUM SERPL-SCNC: 4.4 MMOL/L (ref 3.5–5)
POTASSIUM SERPL-SCNC: 4.5 MMOL/L (ref 3.5–5)
POTASSIUM SERPL-SCNC: 4.6 MMOL/L (ref 3.5–5)
POTASSIUM SERPL-SCNC: 4.8 MMOL/L (ref 3.5–5)
PRO-BNP: 425 PG/ML (ref 0–450)
PROCALCITONIN: 0.08 NG/ML (ref 0–0.08)
PROCALCITONIN: 0.09 NG/ML (ref 0–0.08)
PROTEIN UA: NEGATIVE MG/DL
PROTHROMBIN TIME: 10.1 SEC (ref 9.3–12.4)
PROTHROMBIN TIME: 12.8 SEC (ref 9.3–12.4)
RBC # BLD: 3.65 E12/L (ref 3.5–5.5)
RBC # BLD: 4.19 E12/L (ref 3.5–5.5)
RBC # BLD: 4.21 E12/L (ref 3.5–5.5)
RBC # BLD: 4.22 E12/L (ref 3.5–5.5)
RBC # BLD: 4.32 E12/L (ref 3.5–5.5)
RBC # BLD: 4.48 E12/L (ref 3.5–5.5)
RBC # BLD: 4.55 E12/L (ref 3.5–5.5)
RBC # BLD: 4.6 E12/L (ref 3.5–5.5)
RBC # BLD: 4.65 E12/L (ref 3.5–5.5)
RBC # BLD: 4.75 E12/L (ref 3.5–5.5)
RBC UA: ABNORMAL /HPF (ref 0–2)
REASON FOR REJECTION: NORMAL
REJECTED TEST: NORMAL
REPORT: NORMAL
RI(T): 1106 %
SARS-COV-2: DETECTED
SEDIMENTATION RATE, ERYTHROCYTE: 55 MM/HR (ref 0–20)
SEDIMENTATION RATE, ERYTHROCYTE: 63 MM/HR (ref 0–20)
SODIUM BLD-SCNC: 131 MMOL/L (ref 132–146)
SODIUM BLD-SCNC: 135 MMOL/L (ref 132–146)
SODIUM BLD-SCNC: 136 MMOL/L (ref 132–146)
SODIUM BLD-SCNC: 136 MMOL/L (ref 132–146)
SODIUM BLD-SCNC: 137 MMOL/L (ref 132–146)
SODIUM BLD-SCNC: 137 MMOL/L (ref 132–146)
SODIUM BLD-SCNC: 138 MMOL/L (ref 132–146)
SODIUM BLD-SCNC: 138 MMOL/L (ref 132–146)
SODIUM BLD-SCNC: 141 MMOL/L (ref 132–146)
SOURCE, BLOOD GAS: ABNORMAL
SOURCE, BLOOD GAS: ABNORMAL
SOURCE: ABNORMAL
SPECIFIC GRAVITY UA: 1.02 (ref 1–1.03)
TARGET CELLS: ABNORMAL
TEAR DROP CELLS: ABNORMAL
THB: 11.5 G/DL (ref 11.5–16.5)
THB: 11.6 G/DL (ref 11.5–16.5)
THIS TEST SENT TO: NORMAL
TIME ANALYZED: 1143
TIME ANALYZED: 1411
TOTAL PROTEIN: 5.2 G/DL (ref 6.4–8.3)
TOTAL PROTEIN: 5.4 G/DL (ref 6.4–8.3)
TOTAL PROTEIN: 5.5 G/DL (ref 6.4–8.3)
TOTAL PROTEIN: 5.7 G/DL (ref 6.4–8.3)
TOTAL PROTEIN: 5.8 G/DL (ref 6.4–8.3)
TOTAL PROTEIN: 6.5 G/DL (ref 6.4–8.3)
TROPONIN: <0.01 NG/ML (ref 0–0.03)
TSH SERPL DL<=0.05 MIU/L-ACNC: 0.98 UIU/ML (ref 0.27–4.2)
URINE CULTURE, ROUTINE: NORMAL
UROBILINOGEN, URINE: 0.2 E.U./DL
WBC # BLD: 11.6 E9/L (ref 4.5–11.5)
WBC # BLD: 12.2 E9/L (ref 4.5–11.5)
WBC # BLD: 16.6 E9/L (ref 4.5–11.5)
WBC # BLD: 18.9 E9/L (ref 4.5–11.5)
WBC # BLD: 19.5 E9/L (ref 4.5–11.5)
WBC # BLD: 6.9 E9/L (ref 4.5–11.5)
WBC # BLD: 8 E9/L (ref 4.5–11.5)
WBC # BLD: 8.1 E9/L (ref 4.5–11.5)
WBC # BLD: 8.4 E9/L (ref 4.5–11.5)
WBC # BLD: 9.7 E9/L (ref 4.5–11.5)
WBC UA: ABNORMAL /HPF (ref 0–5)

## 2020-01-01 PROCEDURE — 80048 BASIC METABOLIC PNL TOTAL CA: CPT

## 2020-01-01 PROCEDURE — 2060000000 HC ICU INTERMEDIATE R&B

## 2020-01-01 PROCEDURE — 6370000000 HC RX 637 (ALT 250 FOR IP): Performed by: CLINICAL NURSE SPECIALIST

## 2020-01-01 PROCEDURE — 85378 FIBRIN DEGRADE SEMIQUANT: CPT

## 2020-01-01 PROCEDURE — 85025 COMPLETE CBC W/AUTO DIFF WBC: CPT

## 2020-01-01 PROCEDURE — 6360000002 HC RX W HCPCS: Performed by: NURSE PRACTITIONER

## 2020-01-01 PROCEDURE — 36415 COLL VENOUS BLD VENIPUNCTURE: CPT

## 2020-01-01 PROCEDURE — 1090F PRES/ABSN URINE INCON ASSESS: CPT | Performed by: FAMILY MEDICINE

## 2020-01-01 PROCEDURE — 85610 PROTHROMBIN TIME: CPT

## 2020-01-01 PROCEDURE — 4040F PNEUMOC VAC/ADMIN/RCVD: CPT | Performed by: PODIATRIST

## 2020-01-01 PROCEDURE — 2580000003 HC RX 258: Performed by: FAMILY MEDICINE

## 2020-01-01 PROCEDURE — 51702 INSERT TEMP BLADDER CATH: CPT

## 2020-01-01 PROCEDURE — 82728 ASSAY OF FERRITIN: CPT

## 2020-01-01 PROCEDURE — 2000000000 HC ICU R&B

## 2020-01-01 PROCEDURE — 6370000000 HC RX 637 (ALT 250 FOR IP): Performed by: FAMILY MEDICINE

## 2020-01-01 PROCEDURE — 85730 THROMBOPLASTIN TIME PARTIAL: CPT

## 2020-01-01 PROCEDURE — 4040F PNEUMOC VAC/ADMIN/RCVD: CPT | Performed by: FAMILY MEDICINE

## 2020-01-01 PROCEDURE — G8420 CALC BMI NORM PARAMETERS: HCPCS | Performed by: PODIATRIST

## 2020-01-01 PROCEDURE — 6360000002 HC RX W HCPCS: Performed by: FAMILY MEDICINE

## 2020-01-01 PROCEDURE — 97530 THERAPEUTIC ACTIVITIES: CPT

## 2020-01-01 PROCEDURE — 93005 ELECTROCARDIOGRAM TRACING: CPT | Performed by: EMERGENCY MEDICINE

## 2020-01-01 PROCEDURE — 2500000003 HC RX 250 WO HCPCS: Performed by: FAMILY MEDICINE

## 2020-01-01 PROCEDURE — 87081 CULTURE SCREEN ONLY: CPT

## 2020-01-01 PROCEDURE — G8484 FLU IMMUNIZE NO ADMIN: HCPCS | Performed by: FAMILY MEDICINE

## 2020-01-01 PROCEDURE — 2700000000 HC OXYGEN THERAPY PER DAY

## 2020-01-01 PROCEDURE — 71045 X-RAY EXAM CHEST 1 VIEW: CPT

## 2020-01-01 PROCEDURE — 1123F ACP DISCUSS/DSCN MKR DOCD: CPT | Performed by: FAMILY MEDICINE

## 2020-01-01 PROCEDURE — G8427 DOCREV CUR MEDS BY ELIG CLIN: HCPCS | Performed by: FAMILY MEDICINE

## 2020-01-01 PROCEDURE — 1036F TOBACCO NON-USER: CPT | Performed by: FAMILY MEDICINE

## 2020-01-01 PROCEDURE — APPSS30 APP SPLIT SHARED TIME 16-30 MINUTES: Performed by: CLINICAL NURSE SPECIALIST

## 2020-01-01 PROCEDURE — 97110 THERAPEUTIC EXERCISES: CPT | Performed by: PHYSICAL THERAPIST

## 2020-01-01 PROCEDURE — 85027 COMPLETE CBC AUTOMATED: CPT

## 2020-01-01 PROCEDURE — 6360000004 HC RX CONTRAST MEDICATION: Performed by: RADIOLOGY

## 2020-01-01 PROCEDURE — XW033E5 INTRODUCTION OF REMDESIVIR ANTI-INFECTIVE INTO PERIPHERAL VEIN, PERCUTANEOUS APPROACH, NEW TECHNOLOGY GROUP 5: ICD-10-PCS | Performed by: INTERNAL MEDICINE

## 2020-01-01 PROCEDURE — 84145 PROCALCITONIN (PCT): CPT

## 2020-01-01 PROCEDURE — 81001 URINALYSIS AUTO W/SCOPE: CPT

## 2020-01-01 PROCEDURE — 90471 IMMUNIZATION ADMIN: CPT | Performed by: FAMILY MEDICINE

## 2020-01-01 PROCEDURE — 80053 COMPREHEN METABOLIC PANEL: CPT

## 2020-01-01 PROCEDURE — 83520 IMMUNOASSAY QUANT NOS NONAB: CPT

## 2020-01-01 PROCEDURE — G8427 DOCREV CUR MEDS BY ELIG CLIN: HCPCS | Performed by: PODIATRIST

## 2020-01-01 PROCEDURE — 99239 HOSP IP/OBS DSCHRG MGMT >30: CPT | Performed by: INTERNAL MEDICINE

## 2020-01-01 PROCEDURE — 1090F PRES/ABSN URINE INCON ASSESS: CPT | Performed by: PODIATRIST

## 2020-01-01 PROCEDURE — 83615 LACTATE (LD) (LDH) ENZYME: CPT

## 2020-01-01 PROCEDURE — 99232 SBSQ HOSP IP/OBS MODERATE 35: CPT | Performed by: FAMILY MEDICINE

## 2020-01-01 PROCEDURE — 6360000002 HC RX W HCPCS: Performed by: CLINICAL NURSE SPECIALIST

## 2020-01-01 PROCEDURE — 99232 SBSQ HOSP IP/OBS MODERATE 35: CPT | Performed by: INTERNAL MEDICINE

## 2020-01-01 PROCEDURE — 71046 X-RAY EXAM CHEST 2 VIEWS: CPT

## 2020-01-01 PROCEDURE — 99213 OFFICE O/P EST LOW 20 MIN: CPT | Performed by: PODIATRIST

## 2020-01-01 PROCEDURE — 82248 BILIRUBIN DIRECT: CPT

## 2020-01-01 PROCEDURE — 82805 BLOOD GASES W/O2 SATURATION: CPT

## 2020-01-01 PROCEDURE — 85651 RBC SED RATE NONAUTOMATED: CPT

## 2020-01-01 PROCEDURE — 99232 SBSQ HOSP IP/OBS MODERATE 35: CPT | Performed by: CLINICAL NURSE SPECIALIST

## 2020-01-01 PROCEDURE — 90694 VACC AIIV4 NO PRSRV 0.5ML IM: CPT | Performed by: FAMILY MEDICINE

## 2020-01-01 PROCEDURE — 6370000000 HC RX 637 (ALT 250 FOR IP): Performed by: INTERNAL MEDICINE

## 2020-01-01 PROCEDURE — 86140 C-REACTIVE PROTEIN: CPT

## 2020-01-01 PROCEDURE — 97162 PT EVAL MOD COMPLEX 30 MIN: CPT | Performed by: PHYSICAL THERAPIST

## 2020-01-01 PROCEDURE — G8482 FLU IMMUNIZE ORDER/ADMIN: HCPCS | Performed by: FAMILY MEDICINE

## 2020-01-01 PROCEDURE — 85384 FIBRINOGEN ACTIVITY: CPT

## 2020-01-01 PROCEDURE — 97530 THERAPEUTIC ACTIVITIES: CPT | Performed by: PHYSICAL THERAPIST

## 2020-01-01 PROCEDURE — 6360000002 HC RX W HCPCS: Performed by: EMERGENCY MEDICINE

## 2020-01-01 PROCEDURE — 73521 X-RAY EXAM HIPS BI 2 VIEWS: CPT

## 2020-01-01 PROCEDURE — G8417 CALC BMI ABV UP PARAM F/U: HCPCS | Performed by: FAMILY MEDICINE

## 2020-01-01 PROCEDURE — G8420 CALC BMI NORM PARAMETERS: HCPCS | Performed by: FAMILY MEDICINE

## 2020-01-01 PROCEDURE — 84484 ASSAY OF TROPONIN QUANT: CPT

## 2020-01-01 PROCEDURE — 99213 OFFICE O/P EST LOW 20 MIN: CPT | Performed by: FAMILY MEDICINE

## 2020-01-01 PROCEDURE — 2580000003 HC RX 258: Performed by: INTERNAL MEDICINE

## 2020-01-01 PROCEDURE — 6360000002 HC RX W HCPCS: Performed by: INTERNAL MEDICINE

## 2020-01-01 PROCEDURE — 99214 OFFICE O/P EST MOD 30 MIN: CPT | Performed by: FAMILY MEDICINE

## 2020-01-01 PROCEDURE — 99223 1ST HOSP IP/OBS HIGH 75: CPT | Performed by: FAMILY MEDICINE

## 2020-01-01 PROCEDURE — 87040 BLOOD CULTURE FOR BACTERIA: CPT

## 2020-01-01 PROCEDURE — 84443 ASSAY THYROID STIM HORMONE: CPT

## 2020-01-01 PROCEDURE — 87088 URINE BACTERIA CULTURE: CPT

## 2020-01-01 PROCEDURE — 83880 ASSAY OF NATRIURETIC PEPTIDE: CPT

## 2020-01-01 PROCEDURE — 99285 EMERGENCY DEPT VISIT HI MDM: CPT

## 2020-01-01 PROCEDURE — 71275 CT ANGIOGRAPHY CHEST: CPT

## 2020-01-01 PROCEDURE — 94761 N-INVAS EAR/PLS OXIMETRY MLT: CPT

## 2020-01-01 PROCEDURE — 1123F ACP DISCUSS/DSCN MKR DOCD: CPT | Performed by: PODIATRIST

## 2020-01-01 PROCEDURE — 97110 THERAPEUTIC EXERCISES: CPT

## 2020-01-01 PROCEDURE — 1036F TOBACCO NON-USER: CPT | Performed by: PODIATRIST

## 2020-01-01 RX ORDER — THIAMINE MONONITRATE (VIT B1) 100 MG
100 TABLET ORAL DAILY
Status: DISCONTINUED | OUTPATIENT
Start: 2020-01-01 | End: 2020-01-01 | Stop reason: HOSPADM

## 2020-01-01 RX ORDER — ATORVASTATIN CALCIUM 10 MG/1
10 TABLET, FILM COATED ORAL NIGHTLY
Status: DISCONTINUED | OUTPATIENT
Start: 2020-01-01 | End: 2020-01-01 | Stop reason: HOSPADM

## 2020-01-01 RX ORDER — GABAPENTIN 100 MG/1
100 CAPSULE ORAL 3 TIMES DAILY
Qty: 90 CAPSULE | Refills: 5 | Status: SHIPPED | OUTPATIENT
Start: 2020-01-01 | End: 2020-01-01

## 2020-01-01 RX ORDER — TRAZODONE HYDROCHLORIDE 50 MG/1
50 TABLET ORAL NIGHTLY
Status: DISCONTINUED | OUTPATIENT
Start: 2020-01-01 | End: 2020-01-01 | Stop reason: HOSPADM

## 2020-01-01 RX ORDER — DEXAMETHASONE 4 MG/1
6 TABLET ORAL DAILY
Status: DISCONTINUED | OUTPATIENT
Start: 2020-01-01 | End: 2020-01-01

## 2020-01-01 RX ORDER — LORAZEPAM 0.5 MG/1
0.5 TABLET ORAL EVERY 4 HOURS PRN
Qty: 12 TABLET | Refills: 0 | Status: SHIPPED | OUTPATIENT
Start: 2020-01-01 | End: 2020-01-01

## 2020-01-01 RX ORDER — DEXAMETHASONE SODIUM PHOSPHATE 10 MG/ML
6 INJECTION, SOLUTION INTRAMUSCULAR; INTRAVENOUS ONCE
Status: COMPLETED | OUTPATIENT
Start: 2020-01-01 | End: 2020-01-01

## 2020-01-01 RX ORDER — PANTOPRAZOLE SODIUM 40 MG/1
40 TABLET, DELAYED RELEASE ORAL
Status: DISCONTINUED | OUTPATIENT
Start: 2020-01-01 | End: 2020-01-01 | Stop reason: HOSPADM

## 2020-01-01 RX ORDER — DEXAMETHASONE 6 MG/1
6 TABLET ORAL
Qty: 7 TABLET | Refills: 0 | Status: SHIPPED | OUTPATIENT
Start: 2020-01-01 | End: 2020-12-15

## 2020-01-01 RX ORDER — BENZONATATE 100 MG/1
100 CAPSULE ORAL 3 TIMES DAILY PRN
Qty: 30 CAPSULE | Refills: 0 | Status: SHIPPED | OUTPATIENT
Start: 2020-01-01 | End: 2020-01-01

## 2020-01-01 RX ORDER — AMLODIPINE BESYLATE 5 MG/1
TABLET ORAL
Qty: 30 TABLET | Refills: 5 | Status: ON HOLD
Start: 2020-01-01 | End: 2020-01-01 | Stop reason: HOSPADM

## 2020-01-01 RX ORDER — AZITHROMYCIN 250 MG/1
TABLET, FILM COATED ORAL
Qty: 6 TABLET | Refills: 0 | Status: ON HOLD
Start: 2020-01-01 | End: 2020-01-01 | Stop reason: HOSPADM

## 2020-01-01 RX ORDER — ACETAMINOPHEN 650 MG/1
650 SUPPOSITORY RECTAL EVERY 6 HOURS PRN
Status: DISCONTINUED | OUTPATIENT
Start: 2020-01-01 | End: 2020-01-01 | Stop reason: HOSPADM

## 2020-01-01 RX ORDER — MORPHINE SULFATE 100 MG/5ML
10 SOLUTION ORAL
Qty: 10 ML | Refills: 0 | Status: SHIPPED | OUTPATIENT
Start: 2020-01-01 | End: 2020-01-01

## 2020-01-01 RX ORDER — CEFDINIR 300 MG/1
300 CAPSULE ORAL 2 TIMES DAILY
Qty: 20 CAPSULE | Refills: 0 | Status: ON HOLD
Start: 2020-01-01 | End: 2020-01-01 | Stop reason: HOSPADM

## 2020-01-01 RX ORDER — ATORVASTATIN CALCIUM 10 MG/1
10 TABLET, FILM COATED ORAL DAILY
Status: DISCONTINUED | OUTPATIENT
Start: 2020-01-01 | End: 2020-01-01

## 2020-01-01 RX ORDER — DEXAMETHASONE SODIUM PHOSPHATE 4 MG/ML
6 INJECTION, SOLUTION INTRA-ARTICULAR; INTRALESIONAL; INTRAMUSCULAR; INTRAVENOUS; SOFT TISSUE EVERY 12 HOURS
Status: COMPLETED | OUTPATIENT
Start: 2020-01-01 | End: 2020-01-01

## 2020-01-01 RX ORDER — LORATADINE 10 MG/1
10 TABLET ORAL DAILY
Qty: 30 TABLET | Refills: 0 | Status: SHIPPED | OUTPATIENT
Start: 2020-01-01 | End: 2020-01-01

## 2020-01-01 RX ORDER — SODIUM CHLORIDE 9 MG/ML
INJECTION, SOLUTION INTRAVENOUS EVERY 12 HOURS
Status: DISCONTINUED | OUTPATIENT
Start: 2020-01-01 | End: 2020-01-01 | Stop reason: HOSPADM

## 2020-01-01 RX ORDER — ASCORBIC ACID 500 MG
500 TABLET ORAL DAILY
Status: DISCONTINUED | OUTPATIENT
Start: 2020-01-01 | End: 2020-01-01 | Stop reason: HOSPADM

## 2020-01-01 RX ORDER — BROMPHENIRAMINE MALEATE, PSEUDOEPHEDRINE HYDROCHLORIDE, AND DEXTROMETHORPHAN HYDROBROMIDE 2; 30; 10 MG/5ML; MG/5ML; MG/5ML
5 SYRUP ORAL 4 TIMES DAILY PRN
Qty: 240 ML | Refills: 0 | Status: ON HOLD
Start: 2020-01-01 | End: 2020-01-01 | Stop reason: HOSPADM

## 2020-01-01 RX ORDER — METHYLPREDNISOLONE 4 MG/1
TABLET ORAL
Qty: 1 KIT | Refills: 0 | Status: SHIPPED
Start: 2020-01-01 | End: 2020-01-01

## 2020-01-01 RX ORDER — ACETAMINOPHEN 325 MG/1
650 TABLET ORAL EVERY 6 HOURS PRN
Status: DISCONTINUED | OUTPATIENT
Start: 2020-01-01 | End: 2020-01-01 | Stop reason: HOSPADM

## 2020-01-01 RX ORDER — BENZONATATE 100 MG/1
100 CAPSULE ORAL 3 TIMES DAILY PRN
Status: DISCONTINUED | OUTPATIENT
Start: 2020-01-01 | End: 2020-01-01 | Stop reason: HOSPADM

## 2020-01-01 RX ORDER — PROMETHAZINE HYDROCHLORIDE 25 MG/1
12.5 TABLET ORAL EVERY 6 HOURS PRN
Status: DISCONTINUED | OUTPATIENT
Start: 2020-01-01 | End: 2020-01-01 | Stop reason: HOSPADM

## 2020-01-01 RX ORDER — TRAZODONE HYDROCHLORIDE 50 MG/1
50 TABLET ORAL NIGHTLY
Qty: 30 TABLET | Refills: 2 | Status: SHIPPED | OUTPATIENT
Start: 2020-01-01

## 2020-01-01 RX ORDER — LOSARTAN POTASSIUM 50 MG/1
50 TABLET ORAL DAILY
Status: DISCONTINUED | OUTPATIENT
Start: 2020-01-01 | End: 2020-01-01 | Stop reason: HOSPADM

## 2020-01-01 RX ORDER — SODIUM CHLORIDE 0.9 % (FLUSH) 0.9 %
10 SYRINGE (ML) INJECTION PRN
Status: DISCONTINUED | OUTPATIENT
Start: 2020-01-01 | End: 2020-01-01

## 2020-01-01 RX ORDER — FENTANYL CITRATE 50 UG/ML
25 INJECTION, SOLUTION INTRAMUSCULAR; INTRAVENOUS
Status: DISCONTINUED | OUTPATIENT
Start: 2020-01-01 | End: 2020-01-01 | Stop reason: HOSPADM

## 2020-01-01 RX ORDER — HEPARIN SODIUM 10000 [USP'U]/100ML
10 INJECTION, SOLUTION INTRAVENOUS CONTINUOUS
Status: DISCONTINUED | OUTPATIENT
Start: 2020-01-01 | End: 2020-01-01

## 2020-01-01 RX ORDER — GUAIFENESIN/DEXTROMETHORPHAN 100-10MG/5
5 SYRUP ORAL EVERY 4 HOURS PRN
Status: DISCONTINUED | OUTPATIENT
Start: 2020-01-01 | End: 2020-01-01 | Stop reason: HOSPADM

## 2020-01-01 RX ORDER — SODIUM CHLORIDE 0.9 % (FLUSH) 0.9 %
10 SYRINGE (ML) INJECTION PRN
Status: DISCONTINUED | OUTPATIENT
Start: 2020-01-01 | End: 2020-01-01 | Stop reason: HOSPADM

## 2020-01-01 RX ORDER — CEPHALEXIN 500 MG/1
500 CAPSULE ORAL 3 TIMES DAILY
Qty: 30 CAPSULE | Refills: 0 | Status: SHIPPED
Start: 2020-01-01 | End: 2020-01-01 | Stop reason: SDUPTHER

## 2020-01-01 RX ORDER — BENZONATATE 100 MG/1
100 CAPSULE ORAL 3 TIMES DAILY PRN
Qty: 30 CAPSULE | Refills: 0 | Status: SHIPPED | OUTPATIENT
Start: 2020-01-01 | End: 2020-12-15

## 2020-01-01 RX ORDER — HYDROCHLOROTHIAZIDE 12.5 MG/1
12.5 TABLET ORAL DAILY
Status: DISCONTINUED | OUTPATIENT
Start: 2020-01-01 | End: 2020-01-01 | Stop reason: HOSPADM

## 2020-01-01 RX ORDER — DEXAMETHASONE 6 MG/1
6 TABLET ORAL
Qty: 7 TABLET | Refills: 0 | Status: ON HOLD
Start: 2020-01-01 | End: 2020-01-01

## 2020-01-01 RX ORDER — SODIUM CHLORIDE 0.9 % (FLUSH) 0.9 %
10 SYRINGE (ML) INJECTION EVERY 12 HOURS SCHEDULED
Status: DISCONTINUED | OUTPATIENT
Start: 2020-01-01 | End: 2020-01-01 | Stop reason: HOSPADM

## 2020-01-01 RX ORDER — HEPARIN SODIUM 1000 [USP'U]/ML
60 INJECTION, SOLUTION INTRAVENOUS; SUBCUTANEOUS ONCE
Status: COMPLETED | OUTPATIENT
Start: 2020-01-01 | End: 2020-01-01

## 2020-01-01 RX ORDER — GABAPENTIN 100 MG/1
100 CAPSULE ORAL 3 TIMES DAILY
Status: DISCONTINUED | OUTPATIENT
Start: 2020-01-01 | End: 2020-01-01 | Stop reason: HOSPADM

## 2020-01-01 RX ORDER — HEPARIN SODIUM 1000 [USP'U]/ML
30 INJECTION, SOLUTION INTRAVENOUS; SUBCUTANEOUS PRN
Status: DISCONTINUED | OUTPATIENT
Start: 2020-01-01 | End: 2020-01-01

## 2020-01-01 RX ORDER — 0.9 % SODIUM CHLORIDE 0.9 %
30 INTRAVENOUS SOLUTION INTRAVENOUS PRN
Status: DISCONTINUED | OUTPATIENT
Start: 2020-01-01 | End: 2020-01-01 | Stop reason: HOSPADM

## 2020-01-01 RX ORDER — CEPHALEXIN 500 MG/1
500 CAPSULE ORAL 3 TIMES DAILY
Qty: 30 CAPSULE | Refills: 0 | Status: SHIPPED | OUTPATIENT
Start: 2020-01-01 | End: 2020-01-01

## 2020-01-01 RX ORDER — PREDNISONE 20 MG/1
20 TABLET ORAL 2 TIMES DAILY
Qty: 10 TABLET | Refills: 0 | Status: SHIPPED | OUTPATIENT
Start: 2020-01-01 | End: 2020-01-01

## 2020-01-01 RX ORDER — AZITHROMYCIN 250 MG/1
TABLET, FILM COATED ORAL
Qty: 6 TABLET | Refills: 0 | Status: SHIPPED
Start: 2020-01-01 | End: 2020-01-01

## 2020-01-01 RX ORDER — FLUTICASONE PROPIONATE 220 UG/1
2 AEROSOL, METERED RESPIRATORY (INHALATION) 2 TIMES DAILY
Status: DISCONTINUED | OUTPATIENT
Start: 2020-01-01 | End: 2020-01-01

## 2020-01-01 RX ORDER — ZINC SULFATE 50(220)MG
50 CAPSULE ORAL DAILY
Status: DISCONTINUED | OUTPATIENT
Start: 2020-01-01 | End: 2020-01-01 | Stop reason: HOSPADM

## 2020-01-01 RX ORDER — BENZONATATE 100 MG/1
100 CAPSULE ORAL 3 TIMES DAILY PRN
Qty: 30 CAPSULE | Refills: 0 | Status: ON HOLD
Start: 2020-01-01 | End: 2020-01-01

## 2020-01-01 RX ORDER — AMOXICILLIN AND CLAVULANATE POTASSIUM 875; 125 MG/1; MG/1
1 TABLET, FILM COATED ORAL 2 TIMES DAILY
Qty: 20 TABLET | Refills: 0 | Status: SHIPPED
Start: 2020-01-01 | End: 2020-01-01

## 2020-01-01 RX ORDER — TRAZODONE HYDROCHLORIDE 50 MG/1
50 TABLET ORAL NIGHTLY
Qty: 30 TABLET | Refills: 2 | Status: SHIPPED
Start: 2020-01-01 | End: 2020-01-01 | Stop reason: SDUPTHER

## 2020-01-01 RX ORDER — POLYETHYLENE GLYCOL 3350 17 G/17G
17 POWDER, FOR SOLUTION ORAL DAILY PRN
Status: DISCONTINUED | OUTPATIENT
Start: 2020-01-01 | End: 2020-01-01 | Stop reason: HOSPADM

## 2020-01-01 RX ORDER — GLYCOPYRROLATE 1 MG/1
2 TABLET ORAL EVERY 6 HOURS PRN
Qty: 90 TABLET | Refills: 0 | Status: SHIPPED | OUTPATIENT
Start: 2020-01-01

## 2020-01-01 RX ORDER — FERROUS SULFATE 325(65) MG
325 TABLET ORAL 2 TIMES DAILY
Status: DISCONTINUED | OUTPATIENT
Start: 2020-01-01 | End: 2020-01-01 | Stop reason: HOSPADM

## 2020-01-01 RX ORDER — ONDANSETRON 2 MG/ML
4 INJECTION INTRAMUSCULAR; INTRAVENOUS EVERY 6 HOURS PRN
Status: DISCONTINUED | OUTPATIENT
Start: 2020-01-01 | End: 2020-01-01 | Stop reason: HOSPADM

## 2020-01-01 RX ORDER — HEPARIN SODIUM 1000 [USP'U]/ML
60 INJECTION, SOLUTION INTRAVENOUS; SUBCUTANEOUS PRN
Status: DISCONTINUED | OUTPATIENT
Start: 2020-01-01 | End: 2020-01-01

## 2020-01-01 RX ADMIN — CEFEPIME 2 G: 2 INJECTION, POWDER, FOR SOLUTION INTRAMUSCULAR; INTRAVENOUS at 17:00

## 2020-01-01 RX ADMIN — Medication 10 ML: at 13:11

## 2020-01-01 RX ADMIN — CEFTRIAXONE 1 G: 1 INJECTION, POWDER, FOR SOLUTION INTRAMUSCULAR; INTRAVENOUS at 23:29

## 2020-01-01 RX ADMIN — FLUTICASONE PROPIONATE 2 PUFF: 220 AEROSOL, METERED RESPIRATORY (INHALATION) at 22:30

## 2020-01-01 RX ADMIN — TRAZODONE HYDROCHLORIDE 50 MG: 50 TABLET ORAL at 23:42

## 2020-01-01 RX ADMIN — ACETAMINOPHEN 650 MG: 325 TABLET ORAL at 23:43

## 2020-01-01 RX ADMIN — Medication 10 ML: at 21:50

## 2020-01-01 RX ADMIN — PANTOPRAZOLE SODIUM 40 MG: 40 TABLET, DELAYED RELEASE ORAL at 04:29

## 2020-01-01 RX ADMIN — GABAPENTIN 100 MG: 100 CAPSULE ORAL at 14:06

## 2020-01-01 RX ADMIN — GABAPENTIN 100 MG: 100 CAPSULE ORAL at 16:00

## 2020-01-01 RX ADMIN — PANTOPRAZOLE SODIUM 40 MG: 40 TABLET, DELAYED RELEASE ORAL at 06:19

## 2020-01-01 RX ADMIN — TRAZODONE HYDROCHLORIDE 50 MG: 50 TABLET ORAL at 21:50

## 2020-01-01 RX ADMIN — REMDESIVIR 100 MG: 100 INJECTION, POWDER, LYOPHILIZED, FOR SOLUTION INTRAVENOUS at 20:42

## 2020-01-01 RX ADMIN — Medication 10 ML: at 23:43

## 2020-01-01 RX ADMIN — FERROUS SULFATE TAB 325 MG (65 MG ELEMENTAL FE) 325 MG: 325 (65 FE) TAB at 20:30

## 2020-01-01 RX ADMIN — Medication 10 ML: at 08:42

## 2020-01-01 RX ADMIN — FERROUS SULFATE TAB 325 MG (65 MG ELEMENTAL FE) 325 MG: 325 (65 FE) TAB at 10:17

## 2020-01-01 RX ADMIN — LOSARTAN POTASSIUM 50 MG: 50 TABLET, FILM COATED ORAL at 09:38

## 2020-01-01 RX ADMIN — FERROUS SULFATE TAB 325 MG (65 MG ELEMENTAL FE) 325 MG: 325 (65 FE) TAB at 08:55

## 2020-01-01 RX ADMIN — PANTOPRAZOLE SODIUM 40 MG: 40 TABLET, DELAYED RELEASE ORAL at 04:23

## 2020-01-01 RX ADMIN — ATORVASTATIN CALCIUM 10 MG: 10 TABLET, FILM COATED ORAL at 10:36

## 2020-01-01 RX ADMIN — FLUTICASONE PROPIONATE 2 PUFF: 220 AEROSOL, METERED RESPIRATORY (INHALATION) at 10:37

## 2020-01-01 RX ADMIN — GABAPENTIN 100 MG: 100 CAPSULE ORAL at 20:35

## 2020-01-01 RX ADMIN — FERROUS SULFATE TAB 325 MG (65 MG ELEMENTAL FE) 325 MG: 325 (65 FE) TAB at 09:38

## 2020-01-01 RX ADMIN — TRAZODONE HYDROCHLORIDE 50 MG: 50 TABLET ORAL at 20:48

## 2020-01-01 RX ADMIN — ATORVASTATIN CALCIUM 10 MG: 10 TABLET, FILM COATED ORAL at 09:39

## 2020-01-01 RX ADMIN — ENOXAPARIN SODIUM 60 MG: 100 INJECTION SUBCUTANEOUS at 03:12

## 2020-01-01 RX ADMIN — GABAPENTIN 100 MG: 100 CAPSULE ORAL at 20:49

## 2020-01-01 RX ADMIN — ATORVASTATIN CALCIUM 10 MG: 10 TABLET, FILM COATED ORAL at 09:18

## 2020-01-01 RX ADMIN — LOSARTAN POTASSIUM 50 MG: 50 TABLET, FILM COATED ORAL at 08:55

## 2020-01-01 RX ADMIN — FERROUS SULFATE TAB 325 MG (65 MG ELEMENTAL FE) 325 MG: 325 (65 FE) TAB at 10:37

## 2020-01-01 RX ADMIN — Medication 10 ML: at 10:17

## 2020-01-01 RX ADMIN — PANTOPRAZOLE SODIUM 40 MG: 40 TABLET, DELAYED RELEASE ORAL at 15:20

## 2020-01-01 RX ADMIN — Medication 10 ML: at 20:36

## 2020-01-01 RX ADMIN — FERROUS SULFATE TAB 325 MG (65 MG ELEMENTAL FE) 325 MG: 325 (65 FE) TAB at 20:49

## 2020-01-01 RX ADMIN — ENOXAPARIN SODIUM 60 MG: 100 INJECTION SUBCUTANEOUS at 03:15

## 2020-01-01 RX ADMIN — PANTOPRAZOLE SODIUM 40 MG: 40 TABLET, DELAYED RELEASE ORAL at 15:59

## 2020-01-01 RX ADMIN — GABAPENTIN 100 MG: 100 CAPSULE ORAL at 15:20

## 2020-01-01 RX ADMIN — GABAPENTIN 100 MG: 100 CAPSULE ORAL at 20:30

## 2020-01-01 RX ADMIN — GABAPENTIN 100 MG: 100 CAPSULE ORAL at 08:15

## 2020-01-01 RX ADMIN — GABAPENTIN 100 MG: 100 CAPSULE ORAL at 08:55

## 2020-01-01 RX ADMIN — IOPAMIDOL 80 ML: 755 INJECTION, SOLUTION INTRAVENOUS at 15:28

## 2020-01-01 RX ADMIN — PANTOPRAZOLE SODIUM 40 MG: 40 TABLET, DELAYED RELEASE ORAL at 16:46

## 2020-01-01 RX ADMIN — FERROUS SULFATE TAB 325 MG (65 MG ELEMENTAL FE) 325 MG: 325 (65 FE) TAB at 09:18

## 2020-01-01 RX ADMIN — ATORVASTATIN CALCIUM 10 MG: 10 TABLET, FILM COATED ORAL at 08:55

## 2020-01-01 RX ADMIN — REMDESIVIR 100 MG: 100 INJECTION, POWDER, LYOPHILIZED, FOR SOLUTION INTRAVENOUS at 20:30

## 2020-01-01 RX ADMIN — CEFTRIAXONE 1 G: 1 INJECTION, POWDER, FOR SOLUTION INTRAMUSCULAR; INTRAVENOUS at 23:24

## 2020-01-01 RX ADMIN — PANTOPRAZOLE SODIUM 40 MG: 40 TABLET, DELAYED RELEASE ORAL at 15:11

## 2020-01-01 RX ADMIN — HYDROCHLOROTHIAZIDE 12.5 MG: 12.5 TABLET ORAL at 08:40

## 2020-01-01 RX ADMIN — DEXAMETHASONE 6 MG: 4 TABLET ORAL at 08:07

## 2020-01-01 RX ADMIN — ATORVASTATIN CALCIUM 10 MG: 10 TABLET, FILM COATED ORAL at 08:07

## 2020-01-01 RX ADMIN — Medication 500 MG: at 13:10

## 2020-01-01 RX ADMIN — TRAZODONE HYDROCHLORIDE 50 MG: 50 TABLET ORAL at 22:48

## 2020-01-01 RX ADMIN — BENZONATATE 100 MG: 100 CAPSULE ORAL at 08:16

## 2020-01-01 RX ADMIN — LOSARTAN POTASSIUM 50 MG: 50 TABLET, FILM COATED ORAL at 09:18

## 2020-01-01 RX ADMIN — Medication 10 ML: at 08:08

## 2020-01-01 RX ADMIN — HYDROCHLOROTHIAZIDE 12.5 MG: 12.5 TABLET ORAL at 10:38

## 2020-01-01 RX ADMIN — ENOXAPARIN SODIUM 60 MG: 100 INJECTION SUBCUTANEOUS at 15:20

## 2020-01-01 RX ADMIN — SODIUM CHLORIDE: 9 INJECTION, SOLUTION INTRAVENOUS at 21:54

## 2020-01-01 RX ADMIN — FERROUS SULFATE TAB 325 MG (65 MG ELEMENTAL FE) 325 MG: 325 (65 FE) TAB at 20:39

## 2020-01-01 RX ADMIN — VANCOMYCIN HYDROCHLORIDE 750 MG: 5 INJECTION, POWDER, LYOPHILIZED, FOR SOLUTION INTRAVENOUS at 15:00

## 2020-01-01 RX ADMIN — FERROUS SULFATE TAB 325 MG (65 MG ELEMENTAL FE) 325 MG: 325 (65 FE) TAB at 08:42

## 2020-01-01 RX ADMIN — REMDESIVIR 100 MG: 100 INJECTION, POWDER, LYOPHILIZED, FOR SOLUTION INTRAVENOUS at 20:49

## 2020-01-01 RX ADMIN — DEXAMETHASONE SODIUM PHOSPHATE 6 MG: 4 INJECTION, SOLUTION INTRA-ARTICULAR; INTRALESIONAL; INTRAMUSCULAR; INTRAVENOUS; SOFT TISSUE at 23:26

## 2020-01-01 RX ADMIN — ATORVASTATIN CALCIUM 10 MG: 10 TABLET, FILM COATED ORAL at 20:38

## 2020-01-01 RX ADMIN — GABAPENTIN 100 MG: 100 CAPSULE ORAL at 16:46

## 2020-01-01 RX ADMIN — ACETAMINOPHEN 650 MG: 325 TABLET ORAL at 13:10

## 2020-01-01 RX ADMIN — DEXAMETHASONE 6 MG: 4 TABLET ORAL at 22:30

## 2020-01-01 RX ADMIN — FERROUS SULFATE TAB 325 MG (65 MG ELEMENTAL FE) 325 MG: 325 (65 FE) TAB at 08:08

## 2020-01-01 RX ADMIN — Medication 10 ML: at 20:39

## 2020-01-01 RX ADMIN — ZINC SULFATE 220 MG (50 MG) CAPSULE 50 MG: CAPSULE at 15:00

## 2020-01-01 RX ADMIN — CEFTRIAXONE 1 G: 1 INJECTION, POWDER, FOR SOLUTION INTRAMUSCULAR; INTRAVENOUS at 23:42

## 2020-01-01 RX ADMIN — ENOXAPARIN SODIUM 60 MG: 100 INJECTION SUBCUTANEOUS at 04:24

## 2020-01-01 RX ADMIN — GABAPENTIN 100 MG: 100 CAPSULE ORAL at 20:38

## 2020-01-01 RX ADMIN — FENTANYL CITRATE 25 MCG: 50 INJECTION, SOLUTION INTRAMUSCULAR; INTRAVENOUS at 18:07

## 2020-01-01 RX ADMIN — FERROUS SULFATE TAB 325 MG (65 MG ELEMENTAL FE) 325 MG: 325 (65 FE) TAB at 23:42

## 2020-01-01 RX ADMIN — Medication 10 ML: at 20:30

## 2020-01-01 RX ADMIN — CEFEPIME 2 G: 2 INJECTION, POWDER, FOR SOLUTION INTRAMUSCULAR; INTRAVENOUS at 04:53

## 2020-01-01 RX ADMIN — FERROUS SULFATE TAB 325 MG (65 MG ELEMENTAL FE) 325 MG: 325 (65 FE) TAB at 20:40

## 2020-01-01 RX ADMIN — FLUTICASONE PROPIONATE 2 PUFF: 220 AEROSOL, METERED RESPIRATORY (INHALATION) at 08:14

## 2020-01-01 RX ADMIN — REMDESIVIR 100 MG: 100 INJECTION, POWDER, LYOPHILIZED, FOR SOLUTION INTRAVENOUS at 20:38

## 2020-01-01 RX ADMIN — GABAPENTIN 100 MG: 100 CAPSULE ORAL at 21:50

## 2020-01-01 RX ADMIN — GABAPENTIN 100 MG: 100 CAPSULE ORAL at 23:42

## 2020-01-01 RX ADMIN — CEFTRIAXONE 1 G: 1 INJECTION, POWDER, FOR SOLUTION INTRAMUSCULAR; INTRAVENOUS at 23:43

## 2020-01-01 RX ADMIN — REMDESIVIR 200 MG: 100 INJECTION, POWDER, LYOPHILIZED, FOR SOLUTION INTRAVENOUS at 23:27

## 2020-01-01 RX ADMIN — PANTOPRAZOLE SODIUM 40 MG: 40 TABLET, DELAYED RELEASE ORAL at 05:29

## 2020-01-01 RX ADMIN — DEXAMETHASONE 6 MG: 4 TABLET ORAL at 08:42

## 2020-01-01 RX ADMIN — DEXAMETHASONE SODIUM PHOSPHATE 6 MG: 4 INJECTION, SOLUTION INTRA-ARTICULAR; INTRALESIONAL; INTRAMUSCULAR; INTRAVENOUS; SOFT TISSUE at 13:11

## 2020-01-01 RX ADMIN — GABAPENTIN 100 MG: 100 CAPSULE ORAL at 09:18

## 2020-01-01 RX ADMIN — Medication 10 ML: at 08:55

## 2020-01-01 RX ADMIN — FLUTICASONE PROPIONATE 2 PUFF: 220 AEROSOL, METERED RESPIRATORY (INHALATION) at 20:30

## 2020-01-01 RX ADMIN — TRAZODONE HYDROCHLORIDE 50 MG: 50 TABLET ORAL at 20:38

## 2020-01-01 RX ADMIN — DEXAMETHASONE 6 MG: 4 TABLET ORAL at 08:55

## 2020-01-01 RX ADMIN — PANTOPRAZOLE SODIUM 40 MG: 40 TABLET, DELAYED RELEASE ORAL at 05:24

## 2020-01-01 RX ADMIN — DEXAMETHASONE SODIUM PHOSPHATE 6 MG: 10 INJECTION, SOLUTION INTRAMUSCULAR; INTRAVENOUS at 16:08

## 2020-01-01 RX ADMIN — CEFTRIAXONE 1 G: 1 INJECTION, POWDER, FOR SOLUTION INTRAMUSCULAR; INTRAVENOUS at 22:09

## 2020-01-01 RX ADMIN — HEPARIN SODIUM 3370 UNITS: 1000 INJECTION INTRAVENOUS; SUBCUTANEOUS at 15:11

## 2020-01-01 RX ADMIN — HEPARIN SODIUM 10 UNITS/KG/HR: 10000 INJECTION, SOLUTION INTRAVENOUS at 01:10

## 2020-01-01 RX ADMIN — Medication 10 ML: at 09:18

## 2020-01-01 RX ADMIN — FERROUS SULFATE TAB 325 MG (65 MG ELEMENTAL FE) 325 MG: 325 (65 FE) TAB at 21:50

## 2020-01-01 RX ADMIN — ENOXAPARIN SODIUM 60 MG: 100 INJECTION SUBCUTANEOUS at 16:00

## 2020-01-01 RX ADMIN — ATORVASTATIN CALCIUM 10 MG: 10 TABLET, FILM COATED ORAL at 10:17

## 2020-01-01 RX ADMIN — DEXAMETHASONE 6 MG: 4 TABLET ORAL at 10:37

## 2020-01-01 RX ADMIN — DEXAMETHASONE 6 MG: 4 TABLET ORAL at 09:38

## 2020-01-01 RX ADMIN — ENOXAPARIN SODIUM 60 MG: 100 INJECTION SUBCUTANEOUS at 01:52

## 2020-01-01 RX ADMIN — HYDROCHLOROTHIAZIDE 12.5 MG: 12.5 TABLET ORAL at 08:55

## 2020-01-01 RX ADMIN — HYDROCHLOROTHIAZIDE 12.5 MG: 12.5 TABLET ORAL at 09:38

## 2020-01-01 RX ADMIN — SODIUM CHLORIDE: 9 INJECTION, SOLUTION INTRAVENOUS at 09:13

## 2020-01-01 RX ADMIN — ENOXAPARIN SODIUM 60 MG: 100 INJECTION SUBCUTANEOUS at 04:28

## 2020-01-01 RX ADMIN — Medication 10 ML: at 09:39

## 2020-01-01 RX ADMIN — LOSARTAN POTASSIUM 50 MG: 50 TABLET, FILM COATED ORAL at 08:40

## 2020-01-01 RX ADMIN — Medication 10 ML: at 22:31

## 2020-01-01 RX ADMIN — FERROUS SULFATE TAB 325 MG (65 MG ELEMENTAL FE) 325 MG: 325 (65 FE) TAB at 20:35

## 2020-01-01 RX ADMIN — HYDROCHLOROTHIAZIDE 12.5 MG: 12.5 TABLET ORAL at 09:18

## 2020-01-01 RX ADMIN — GABAPENTIN 100 MG: 100 CAPSULE ORAL at 09:00

## 2020-01-01 RX ADMIN — GABAPENTIN 100 MG: 100 CAPSULE ORAL at 13:09

## 2020-01-01 RX ADMIN — ENOXAPARIN SODIUM 50 MG: 100 INJECTION SUBCUTANEOUS at 15:41

## 2020-01-01 RX ADMIN — Medication 10 ML: at 08:40

## 2020-01-01 RX ADMIN — LOSARTAN POTASSIUM 50 MG: 50 TABLET, FILM COATED ORAL at 08:16

## 2020-01-01 RX ADMIN — GABAPENTIN 100 MG: 100 CAPSULE ORAL at 13:51

## 2020-01-01 RX ADMIN — PANTOPRAZOLE SODIUM 40 MG: 40 TABLET, DELAYED RELEASE ORAL at 16:42

## 2020-01-01 RX ADMIN — ENOXAPARIN SODIUM 60 MG: 100 INJECTION SUBCUTANEOUS at 03:48

## 2020-01-01 RX ADMIN — ENOXAPARIN SODIUM 60 MG: 100 INJECTION SUBCUTANEOUS at 15:30

## 2020-01-01 RX ADMIN — ENOXAPARIN SODIUM 60 MG: 100 INJECTION SUBCUTANEOUS at 04:00

## 2020-01-01 RX ADMIN — LOSARTAN POTASSIUM 50 MG: 50 TABLET, FILM COATED ORAL at 10:38

## 2020-01-01 RX ADMIN — ENOXAPARIN SODIUM 60 MG: 100 INJECTION SUBCUTANEOUS at 15:59

## 2020-01-01 RX ADMIN — GABAPENTIN 100 MG: 100 CAPSULE ORAL at 10:17

## 2020-01-01 RX ADMIN — ENOXAPARIN SODIUM 60 MG: 100 INJECTION SUBCUTANEOUS at 16:42

## 2020-01-01 RX ADMIN — Medication 10 ML: at 08:16

## 2020-01-01 RX ADMIN — FERROUS SULFATE TAB 325 MG (65 MG ELEMENTAL FE) 325 MG: 325 (65 FE) TAB at 13:10

## 2020-01-01 RX ADMIN — ENOXAPARIN SODIUM 60 MG: 100 INJECTION SUBCUTANEOUS at 16:46

## 2020-01-01 RX ADMIN — FERROUS SULFATE TAB 325 MG (65 MG ELEMENTAL FE) 325 MG: 325 (65 FE) TAB at 08:15

## 2020-01-01 RX ADMIN — TRAZODONE HYDROCHLORIDE 50 MG: 50 TABLET ORAL at 20:40

## 2020-01-01 RX ADMIN — GABAPENTIN 100 MG: 100 CAPSULE ORAL at 21:00

## 2020-01-01 RX ADMIN — Medication 100 MG: at 13:10

## 2020-01-01 RX ADMIN — HYDROCHLOROTHIAZIDE 12.5 MG: 12.5 TABLET ORAL at 08:08

## 2020-01-01 RX ADMIN — TRAZODONE HYDROCHLORIDE 50 MG: 50 TABLET ORAL at 22:31

## 2020-01-01 RX ADMIN — ATORVASTATIN CALCIUM 10 MG: 10 TABLET, FILM COATED ORAL at 08:42

## 2020-01-01 RX ADMIN — PANTOPRAZOLE SODIUM 40 MG: 40 TABLET, DELAYED RELEASE ORAL at 15:41

## 2020-01-01 RX ADMIN — GABAPENTIN 100 MG: 100 CAPSULE ORAL at 09:15

## 2020-01-01 RX ADMIN — HEPARIN SODIUM 14 UNITS/KG/HR: 10000 INJECTION, SOLUTION INTRAVENOUS at 14:59

## 2020-01-01 RX ADMIN — FERROUS SULFATE TAB 325 MG (65 MG ELEMENTAL FE) 325 MG: 325 (65 FE) TAB at 22:29

## 2020-01-01 RX ADMIN — Medication 10 ML: at 20:42

## 2020-01-01 RX ADMIN — ATORVASTATIN CALCIUM 10 MG: 10 TABLET, FILM COATED ORAL at 08:40

## 2020-01-01 RX ADMIN — ENOXAPARIN SODIUM 60 MG: 100 INJECTION SUBCUTANEOUS at 15:58

## 2020-01-01 RX ADMIN — GABAPENTIN 100 MG: 100 CAPSULE ORAL at 22:30

## 2020-01-01 RX ADMIN — FERROUS SULFATE TAB 325 MG (65 MG ELEMENTAL FE) 325 MG: 325 (65 FE) TAB at 08:40

## 2020-01-01 RX ADMIN — HYDROCHLOROTHIAZIDE 12.5 MG: 12.5 TABLET ORAL at 09:15

## 2020-01-01 RX ADMIN — DEXAMETHASONE 6 MG: 4 TABLET ORAL at 08:13

## 2020-01-01 RX ADMIN — GABAPENTIN 100 MG: 100 CAPSULE ORAL at 15:17

## 2020-01-01 RX ADMIN — TRAZODONE HYDROCHLORIDE 50 MG: 50 TABLET ORAL at 20:49

## 2020-01-01 RX ADMIN — PANTOPRAZOLE SODIUM 40 MG: 40 TABLET, DELAYED RELEASE ORAL at 05:51

## 2020-01-01 RX ADMIN — DEXAMETHASONE SODIUM PHOSPHATE 6 MG: 4 INJECTION, SOLUTION INTRA-ARTICULAR; INTRALESIONAL; INTRAMUSCULAR; INTRAVENOUS; SOFT TISSUE at 10:40

## 2020-01-01 RX ADMIN — TRAZODONE HYDROCHLORIDE 50 MG: 50 TABLET ORAL at 20:30

## 2020-01-01 RX ADMIN — LOSARTAN POTASSIUM 50 MG: 50 TABLET, FILM COATED ORAL at 08:08

## 2020-01-01 RX ADMIN — ENOXAPARIN SODIUM 60 MG: 100 INJECTION SUBCUTANEOUS at 04:11

## 2020-01-01 RX ADMIN — PANTOPRAZOLE SODIUM 40 MG: 40 TABLET, DELAYED RELEASE ORAL at 13:10

## 2020-01-01 RX ADMIN — ATORVASTATIN CALCIUM 10 MG: 10 TABLET, FILM COATED ORAL at 08:15

## 2020-01-01 RX ADMIN — HYDROCHLOROTHIAZIDE 12.5 MG: 12.5 TABLET ORAL at 08:15

## 2020-01-01 RX ADMIN — GABAPENTIN 100 MG: 100 CAPSULE ORAL at 20:40

## 2020-01-01 RX ADMIN — HEPARIN SODIUM 1690 UNITS: 1000 INJECTION INTRAVENOUS; SUBCUTANEOUS at 23:30

## 2020-01-01 RX ADMIN — FENTANYL CITRATE 25 MCG: 50 INJECTION, SOLUTION INTRAMUSCULAR; INTRAVENOUS at 16:41

## 2020-01-01 RX ADMIN — FERROUS SULFATE TAB 325 MG (65 MG ELEMENTAL FE) 325 MG: 325 (65 FE) TAB at 20:41

## 2020-01-01 RX ADMIN — DEXAMETHASONE 6 MG: 4 TABLET ORAL at 08:40

## 2020-01-01 RX ADMIN — GABAPENTIN 100 MG: 100 CAPSULE ORAL at 09:38

## 2020-01-01 RX ADMIN — DEXAMETHASONE 6 MG: 4 TABLET ORAL at 10:17

## 2020-01-01 RX ADMIN — Medication 10 ML: at 20:49

## 2020-01-01 RX ADMIN — FERROUS SULFATE TAB 325 MG (65 MG ELEMENTAL FE) 325 MG: 325 (65 FE) TAB at 21:00

## 2020-01-01 RX ADMIN — ENOXAPARIN SODIUM 60 MG: 100 INJECTION SUBCUTANEOUS at 15:17

## 2020-01-01 RX ADMIN — HEPARIN SODIUM 3370 UNITS: 1000 INJECTION INTRAVENOUS; SUBCUTANEOUS at 01:09

## 2020-01-01 RX ADMIN — PANTOPRAZOLE SODIUM 40 MG: 40 TABLET, DELAYED RELEASE ORAL at 08:42

## 2020-01-01 RX ADMIN — Medication 100 MG: at 09:18

## 2020-01-01 RX ADMIN — Medication 10 ML: at 21:55

## 2020-01-01 RX ADMIN — GABAPENTIN 100 MG: 100 CAPSULE ORAL at 10:37

## 2020-01-01 RX ADMIN — FERROUS SULFATE TAB 325 MG (65 MG ELEMENTAL FE) 325 MG: 325 (65 FE) TAB at 20:48

## 2020-01-01 RX ADMIN — PANTOPRAZOLE SODIUM 40 MG: 40 TABLET, DELAYED RELEASE ORAL at 16:00

## 2020-01-01 RX ADMIN — CEFTRIAXONE 1 G: 1 INJECTION, POWDER, FOR SOLUTION INTRAMUSCULAR; INTRAVENOUS at 23:27

## 2020-01-01 RX ADMIN — CEFTRIAXONE 1 G: 1 INJECTION, POWDER, FOR SOLUTION INTRAMUSCULAR; INTRAVENOUS at 22:32

## 2020-01-01 RX ADMIN — ZINC SULFATE 220 MG (50 MG) CAPSULE 50 MG: CAPSULE at 09:18

## 2020-01-01 RX ADMIN — PANTOPRAZOLE SODIUM 40 MG: 40 TABLET, DELAYED RELEASE ORAL at 15:17

## 2020-01-01 RX ADMIN — Medication 500 MG: at 09:18

## 2020-01-01 RX ADMIN — PANTOPRAZOLE SODIUM 40 MG: 40 TABLET, DELAYED RELEASE ORAL at 08:08

## 2020-01-01 RX ADMIN — PANTOPRAZOLE SODIUM 40 MG: 40 TABLET, DELAYED RELEASE ORAL at 09:16

## 2020-01-01 RX ADMIN — FERROUS SULFATE TAB 325 MG (65 MG ELEMENTAL FE) 325 MG: 325 (65 FE) TAB at 09:15

## 2020-01-01 RX ADMIN — DEXAMETHASONE 6 MG: 4 TABLET ORAL at 09:18

## 2020-01-01 RX ADMIN — Medication 10 ML: at 21:00

## 2020-01-01 RX ADMIN — VANCOMYCIN HYDROCHLORIDE 750 MG: 5 INJECTION, POWDER, LYOPHILIZED, FOR SOLUTION INTRAVENOUS at 15:41

## 2020-01-01 RX ADMIN — TRAZODONE HYDROCHLORIDE 50 MG: 50 TABLET ORAL at 20:41

## 2020-01-01 RX ADMIN — TRAZODONE HYDROCHLORIDE 50 MG: 50 TABLET ORAL at 20:35

## 2020-01-01 RX ADMIN — ENOXAPARIN SODIUM 60 MG: 100 INJECTION SUBCUTANEOUS at 05:29

## 2020-01-01 RX ADMIN — GABAPENTIN 100 MG: 100 CAPSULE ORAL at 16:42

## 2020-01-01 RX ADMIN — GABAPENTIN 100 MG: 100 CAPSULE ORAL at 20:41

## 2020-01-01 RX ADMIN — GABAPENTIN 100 MG: 100 CAPSULE ORAL at 15:59

## 2020-01-01 RX ADMIN — ENOXAPARIN SODIUM 60 MG: 100 INJECTION SUBCUTANEOUS at 13:51

## 2020-01-01 RX ADMIN — GABAPENTIN 100 MG: 100 CAPSULE ORAL at 08:07

## 2020-01-01 RX ADMIN — GABAPENTIN 100 MG: 100 CAPSULE ORAL at 08:42

## 2020-01-01 RX ADMIN — PANTOPRAZOLE SODIUM 40 MG: 40 TABLET, DELAYED RELEASE ORAL at 21:00

## 2020-01-01 ASSESSMENT — ENCOUNTER SYMPTOMS
EYE ITCHING: 0
DIARRHEA: 0
EYE REDNESS: 0
BACK PAIN: 0
COUGH: 1
DIARRHEA: 0
APNEA: 0
CHEST TIGHTNESS: 0
CHEST TIGHTNESS: 0
WHEEZING: 0
APNEA: 0
EYE REDNESS: 0
CHEST TIGHTNESS: 0
RHINORRHEA: 0
RHINORRHEA: 1
SHORTNESS OF BREATH: 0
NAUSEA: 0
DIARRHEA: 0
COLOR CHANGE: 1
PHOTOPHOBIA: 0
PHOTOPHOBIA: 0
SORE THROAT: 0
VOMITING: 0
NAUSEA: 0
ALLERGIC/IMMUNOLOGIC NEGATIVE: 1
HEARTBURN: 1
VOMITING: 0
BACK PAIN: 0
FACIAL SWELLING: 0
WHEEZING: 0
APNEA: 0
ABDOMINAL PAIN: 0
VOMITING: 0
EYE REDNESS: 0
SORE THROAT: 0
SINUS PRESSURE: 0
SORE THROAT: 0
SORE THROAT: 0
BACK PAIN: 0
PHOTOPHOBIA: 0
EYE DISCHARGE: 0
BOWEL INCONTINENCE: 0
APNEA: 0
SHORTNESS OF BREATH: 0
PHOTOPHOBIA: 0
COUGH: 1
SWOLLEN GLANDS: 0
SORE THROAT: 0
WHEEZING: 0
NAUSEA: 0
ABDOMINAL PAIN: 0
SHORTNESS OF BREATH: 0
COLOR CHANGE: 0
SINUS PRESSURE: 0
COUGH: 1
SORE THROAT: 0
EYE PAIN: 0
COUGH: 1
WHEEZING: 0
BLOOD IN STOOL: 0
EYE PAIN: 0
BACK PAIN: 0
ABDOMINAL PAIN: 0
SINUS PRESSURE: 0
FACIAL SWELLING: 0
DIARRHEA: 0
COLOR CHANGE: 0
FACIAL SWELLING: 0
NAUSEA: 0
BLOOD IN STOOL: 0
EYE ITCHING: 0
DIARRHEA: 0
SHORTNESS OF BREATH: 0
SHORTNESS OF BREATH: 0
BACK PAIN: 1
CHEST TIGHTNESS: 0
ABDOMINAL PAIN: 0
NAUSEA: 0
WHEEZING: 0
FACIAL SWELLING: 0
COUGH: 0
BACK PAIN: 0
ALLERGIC/IMMUNOLOGIC NEGATIVE: 1
ALLERGIC/IMMUNOLOGIC NEGATIVE: 1
ABDOMINAL PAIN: 0
VOMITING: 0
ABDOMINAL PAIN: 0
CONSTIPATION: 0
BACK PAIN: 0
VOMITING: 0
CONSTIPATION: 0
DIARRHEA: 0
SORE THROAT: 0
BLOOD IN STOOL: 0
BLOOD IN STOOL: 0
ALLERGIC/IMMUNOLOGIC NEGATIVE: 1
VOMITING: 0
NAUSEA: 0
FACIAL SWELLING: 0
ABDOMINAL DISTENTION: 0
BACK PAIN: 0
COUGH: 1
SINUS PRESSURE: 0
VOMITING: 0
WHEEZING: 0
SINUS PRESSURE: 0
CHEST TIGHTNESS: 0
ABDOMINAL PAIN: 0
COLOR CHANGE: 1
SINUS PRESSURE: 0
EYE PAIN: 0
DIARRHEA: 0
COUGH: 0
SHORTNESS OF BREATH: 1
NAUSEA: 0
SINUS PRESSURE: 0
APNEA: 0
WHEEZING: 0
ALLERGIC/IMMUNOLOGIC NEGATIVE: 1
DIARRHEA: 0
PHOTOPHOBIA: 0
VOMITING: 0
SHORTNESS OF BREATH: 0
COLOR CHANGE: 0
BLOOD IN STOOL: 0
NAUSEA: 0
CHEST TIGHTNESS: 0
APNEA: 0
SINUS PRESSURE: 0
APNEA: 0
SORE THROAT: 0
COLOR CHANGE: 0
SHORTNESS OF BREATH: 0
BLOOD IN STOOL: 0
COUGH: 1
COLOR CHANGE: 0
WHEEZING: 0
BLOOD IN STOOL: 0
ABDOMINAL PAIN: 0
CHEST TIGHTNESS: 0

## 2020-01-01 ASSESSMENT — PATIENT HEALTH QUESTIONNAIRE - PHQ9
1. LITTLE INTEREST OR PLEASURE IN DOING THINGS: 0
SUM OF ALL RESPONSES TO PHQ QUESTIONS 1-9: 0
2. FEELING DOWN, DEPRESSED OR HOPELESS: 0
SUM OF ALL RESPONSES TO PHQ QUESTIONS 1-9: 0
SUM OF ALL RESPONSES TO PHQ9 QUESTIONS 1 & 2: 0

## 2020-01-01 ASSESSMENT — PAIN SCALES - GENERAL
PAINLEVEL_OUTOF10: 0
PAINLEVEL_OUTOF10: 0
PAINLEVEL_OUTOF10: 4
PAINLEVEL_OUTOF10: 0
PAINLEVEL_OUTOF10: 6
PAINLEVEL_OUTOF10: 0
PAINLEVEL_OUTOF10: 6
PAINLEVEL_OUTOF10: 0
PAINLEVEL_OUTOF10: 0

## 2020-01-01 ASSESSMENT — PAIN DESCRIPTION - LOCATION
LOCATION: BACK
LOCATION: HEAD

## 2020-01-03 NOTE — TELEPHONE ENCOUNTER
Called and spoke pharmacy they are out of the medication until Tuesday.  She would like new script sent to [facility] in [place]

## 2020-01-10 NOTE — PROGRESS NOTES
Constitutional: Negative. HENT: Negative for congestion, facial swelling, hearing loss, nosebleeds, sinus pressure and sore throat. Eyes: Negative for photophobia and visual disturbance. Respiratory: Negative for apnea, cough, chest tightness, shortness of breath and wheezing. Cardiovascular: Negative for chest pain, palpitations and leg swelling. Gastrointestinal: Negative for abdominal pain, blood in stool, diarrhea, nausea and vomiting. Genitourinary: Negative for difficulty urinating, frequency and urgency. Musculoskeletal: Negative for arthralgias, back pain, joint swelling and myalgias. Skin: Negative for color change and rash. Allergic/Immunologic: Negative. Neurological: Negative for syncope, weakness, light-headedness and headaches. Hematological: Negative. Psychiatric/Behavioral: Negative for agitation, behavioral problems, confusion and self-injury. The patient is not nervous/anxious. All other systems reviewed and are negative. Physical Exam  Vitals signs and nursing note reviewed. Constitutional:       General: She is not in acute distress. Appearance: She is well-developed. HENT:      Head: Normocephalic and atraumatic. Nose: Nose normal.   Eyes:      Conjunctiva/sclera: Conjunctivae normal.      Pupils: Pupils are equal, round, and reactive to light. Neck:      Musculoskeletal: Normal range of motion and neck supple. Thyroid: No thyromegaly. Vascular: No JVD. Cardiovascular:      Rate and Rhythm: Normal rate and regular rhythm. Heart sounds: Normal heart sounds. No murmur. No friction rub. No gallop. Pulmonary:      Effort: Pulmonary effort is normal. No respiratory distress. Breath sounds: Normal breath sounds. No wheezing. Abdominal:      General: Bowel sounds are normal. There is no distension. Palpations: Abdomen is soft. Tenderness: There is no tenderness. There is no guarding or rebound.    Musculoskeletal:

## 2020-02-26 NOTE — TELEPHONE ENCOUNTER
Pt in need of a refill for cough medicine. She uses [facility] on [address]. Med pended to you.

## 2020-04-01 NOTE — PROGRESS NOTES
Chief Complaint:   Chief Complaint   Patient presents with    Toe Pain     pt needs referral to pod. Toe Pain    The incident occurred 2 days ago. The pain is present in the left toes. The quality of the pain is described as aching. The pain is at a severity of 2/10. The pain is mild. The pain has been worsening since onset. Cough   This is a chronic problem. The current episode started more than 1 year ago. The problem has been waxing and waning. The problem occurs every few hours. The cough is non-productive. Pertinent negatives include no chest pain, headaches, myalgias, rash, sore throat, shortness of breath or wheezing. She has tried prescription cough suppressant for the symptoms. The treatment provided significant relief. Her past medical history is significant for bronchitis. Patient Active Problem List   Diagnosis    Iron deficiency anemia due to chronic blood loss    Near syncope    Essential hypertension    Acute kidney injury ([facility] [address]    Hypertension    Hyperlipidemia    Hernia, hiatal    GERD (gastroesophageal reflux disease)    Arthritis    Diarrhea    Hypokalemia    Primary insomnia       Past Medical History:   Diagnosis Date    Arthritis     GERD (gastroesophageal reflux disease)     Hernia, hiatal     Hyperlipidemia     Hypertension        Past Surgical History:   Procedure Laterality Date    UPPER GASTROINTESTINAL ENDOSCOPY      UPPER GASTROINTESTINAL ENDOSCOPY  06/20/2016    1 biopsy       Current Outpatient Medications   Medication Sig Dispense Refill    cephALEXin (KEFLEX) 500 MG capsule Take 1 capsule by mouth 3 times daily for 10 days 30 capsule 0    HYDROcodone-homatropine (HYCODAN) 5-1.5 MG/5ML syrup Take 5 mLs by mouth every 6 hours as needed (cough) for up to 30 days.  1 teaspoon every 6hours as needed 240 mL 0    traZODone (DESYREL) 50 MG tablet Take 1 tablet by mouth nightly 30 tablet 2    gabapentin (NEURONTIN) 100 MG capsule Take 1 capsule by mouth 3 times daily for 180 days. Intended supply: 30 days 90 capsule 5    azithromycin (ZITHROMAX Z-NIR) 250 MG tablet Use as directed 6 tablet 0    amLODIPine (NORVASC) 5 MG tablet Take 1 tablet by mouth daily 30 tablet 5    mometasone (ELOCON) 0.1 % cream Apply topically daily. 45 g 0    pantoprazole (PROTONIX) 40 MG tablet Take 1 tablet by mouth 2 times daily (before meals) 60 tablet 3    valACYclovir (VALTREX) 1 g tablet   0    ferrous sulfate 325 (65 Fe) MG tablet TAKE 1 TABLET BY MOUTH TWICE DAILY 60 tablet 5    losartan (COZAAR) 50 MG tablet Take 1 tablet by mouth daily 30 tablet 3    fluticasone (FLOVENT HFA) 220 MCG/ACT inhaler 2puffs twice per day      hydrochlorothiazide (HYDRODIURIL) 12.5 MG tablet Take 1 tablet by mouth daily 30 tablet 3    simvastatin (ZOCOR) 10 MG tablet Take 10 mg by mouth nightly.  metoclopramide (REGLAN) 10 MG tablet Take 10 mg by mouth 3 times daily. No current facility-administered medications for this visit.         No Known Allergies    Social History     Socioeconomic History    Marital status:      Spouse name: None    Number of children: None    Years of education: None    Highest education level: None   Occupational History    Occupation: retired     Employer: RETIRED   Social Needs    Financial resource strain: None    Food insecurity     Worry: None     Inability: None    Transportation needs     Medical: None     Non-medical: None   Tobacco Use    Smoking status: Never Smoker    Smokeless tobacco: Never Used   Substance and Sexual Activity    Alcohol use: No     Alcohol/week: 0.0 standard drinks    Drug use: No    Sexual activity: None   Lifestyle    Physical activity     Days per week: None     Minutes per session: None    Stress: None   Relationships    Social connections     Talks on phone: None     Gets together: None     Attends Yazidism service: None     Active member of club or organization: None     Attends meetings of clubs or organizations: None     Relationship status: None    Intimate partner violence     Fear of current or ex partner: None     Emotionally abused: None     Physically abused: None     Forced sexual activity: None   Other Topics Concern    None   Social History Narrative    None       No family history on file. Review of Systems   Constitutional: Negative. HENT: Negative for congestion, facial swelling, hearing loss, nosebleeds, sinus pressure and sore throat. Eyes: Negative for photophobia and visual disturbance. Respiratory: Positive for cough. Negative for apnea, chest tightness, shortness of breath and wheezing. Cardiovascular: Negative for chest pain, palpitations and leg swelling. Gastrointestinal: Negative for abdominal pain, blood in stool, diarrhea, nausea and vomiting. Genitourinary: Negative for difficulty urinating, frequency and urgency. Musculoskeletal: Negative for arthralgias, back pain, joint swelling and myalgias. Skin: Positive for color change. Negative for rash. Allergic/Immunologic: Negative. Neurological: Negative for syncope, weakness, light-headedness and headaches. Hematological: Negative. Psychiatric/Behavioral: Negative for agitation, behavioral problems, confusion and self-injury. The patient is not nervous/anxious. All other systems reviewed and are negative. Physical Exam  Vitals signs and nursing note reviewed. Constitutional:       General: She is not in acute distress. Appearance: She is well-developed. HENT:      Head: Normocephalic and atraumatic. Nose: Nose normal.   Eyes:      Conjunctiva/sclera: Conjunctivae normal.      Pupils: Pupils are equal, round, and reactive to light. Neck:      Musculoskeletal: Normal range of motion and neck supple. Thyroid: No thyromegaly. Vascular: No JVD. Cardiovascular:      Rate and Rhythm: Normal rate and regular rhythm. Heart sounds: Normal heart sounds. No murmur.  No

## 2020-05-22 PROBLEM — L60.0 OC (ONYCHOCRYPTOSIS): Status: ACTIVE | Noted: 2020-01-01

## 2020-06-11 NOTE — PROGRESS NOTES
decreased range of motion and tenderness. Lymphadenopathy:      Cervical: No cervical adenopathy. Skin:     General: Skin is warm and dry. Findings: No erythema or rash. Neurological:      Mental Status: She is alert and oriented to person, place, and time. Cranial Nerves: No cranial nerve deficit. Motor: No abnormal muscle tone. Coordination: Coordination normal.      Deep Tendon Reflexes: Reflexes are normal and symmetric. Psychiatric:         Behavior: Behavior normal.         Judgment: Judgment normal.                               ASSESSMENT/PLAN:    Kristen French was seen today for shoulder pain. Diagnoses and all orders for this visit:    Injury of left shoulder, initial encounter  -     XR SHOULDER LEFT (MIN 2 VIEWS);  Future  -     [facility], [facility]    tylenol prn pain        Aundrea Cisneros DO    6/11/2020  10:57 AM

## 2020-06-16 NOTE — PROGRESS NOTES
aggressive osseous lesions. Moderate degenerative changes of the left acromioclavicular joint. No acute osseous abnormality. Past Medical History:  Past Medical History:   Diagnosis Date    Arthritis     GERD (gastroesophageal reflux disease)     Hernia, hiatal     Hyperlipidemia     Hypertension      Past Surgical History:   Procedure Laterality Date    UPPER GASTROINTESTINAL ENDOSCOPY      UPPER GASTROINTESTINAL ENDOSCOPY  06/20/2016    1 biopsy       Medications:   Current Outpatient Medications   Medication Sig Dispense Refill    amLODIPine (NORVASC) 5 MG tablet take 1 tablet by mouth once daily 30 tablet 5    HYDROcodone-homatropine (HYCODAN) 5-1.5 MG/5ML syrup Take 5 mLs by mouth every 6 hours as needed (cough) for up to 30 days. 1 teaspoon every 6hours as needed 240 mL 0    traZODone (DESYREL) 50 MG tablet Take 1 tablet by mouth nightly 30 tablet 2    gabapentin (NEURONTIN) 100 MG capsule Take 1 capsule by mouth 3 times daily for 180 days. Intended supply: 30 days 90 capsule 5    mometasone (ELOCON) 0.1 % cream Apply topically daily. 45 g 0    pantoprazole (PROTONIX) 40 MG tablet Take 1 tablet by mouth 2 times daily (before meals) 60 tablet 3    valACYclovir (VALTREX) 1 g tablet   0    ferrous sulfate 325 (65 Fe) MG tablet TAKE 1 TABLET BY MOUTH TWICE DAILY 60 tablet 5    losartan (COZAAR) 50 MG tablet Take 1 tablet by mouth daily 30 tablet 3    fluticasone (FLOVENT HFA) 220 MCG/ACT inhaler 2puffs twice per day      hydrochlorothiazide (HYDRODIURIL) 12.5 MG tablet Take 1 tablet by mouth daily 30 tablet 3    simvastatin (ZOCOR) 10 MG tablet Take 10 mg by mouth nightly.  metoclopramide (REGLAN) 10 MG tablet Take 10 mg by mouth 3 times daily. No current facility-administered medications for this visit. Occupation: retired.      Exercise regimen: walking    Hobbies: gardening, taking care of plants    Patient Goals: pain relief, return to prior activity, get back to care.    Physician's Comments/Revisions:               Physician's Printed Name:                                           [de-identified] Signature:                                                               Date:

## 2020-06-25 NOTE — PROGRESS NOTES
[facility] [facility]   Phone: [phone]   Fax: [phone]      Physical Therapy Daily Treatment Note    Date: 2020  Patient Name: Mary Raines  : 1933   MRN: [id number]  DOInjury: 2 weeks  DOSx: NA   Referring Provider: No referring provider defined for this encounter. Medical Diagnosis:     S49. 92XD (ICD-10-CM) - Injury of left shoulder, subsequent encounter    Outcome Measure: QuickDASH 68% limitaiton    S: Patient reports to therapy and states she is feeling very good this day. Reports she would like today to be her last treatment session. O: Pt given written HEP  Time 800-840     Visit 3/6 Repeat outcome measure at mid point and end. Pain 2/10     Strength Right Shoulder: Flexion 4/5,  Abduction 4/5, ER 4/5, IR 4/5    Right Elbow:Flexion 4/5,  Extension 4/5     Left Shoulder: Flexion 3/5,  Abduction 3/5, ER 3/5, IR 3/5   Left Elbow:Flexion 4-/5,  Extension 4-/5     Palpation Tender to palpation anterior and posterior shoulder.       ROM Right Shoulder:  AROM: 155° Forward elevation,  155° abduction, 90° ER,  IR to 90  PROM: 170° Forward elevation, 170° abduction,  90° ER,  90° IR  Apley IR: low thoracic     Left Shoulder:  AROM: 135° Forward elevation, 90° abduction,  80° ER,  IR to 60  PROM: 170° Forward elevation, 170° abduction,  90° ER , 90° IR  Apley IR: low thoracic     Modalities            Manual                  Stretch      Table slides      Wall Flexion      Wall ER stretch      Towel IR stretch      IR reaching behind back      Exercise      UBE 5 min  TE   Pulleys - flex      Pulleys-IR      Supine wand chest press 2x15   TE    TE   Supine flexion 2x15 1 lb TE   S-lying ABD 2x15  1 set no weight, 1 set 1 lb TE   S-lying ER 2 x 15  1 lb TE   Standing wand flex      Standing flexion      Standing ABD            ROWS: H Functional activities  To aid in ROM and strength needed for reaching , lifting ,pushing and pulling at home/work    ROWS: M

## 2020-07-06 NOTE — TELEPHONE ENCOUNTER
Patient's daughter calling requesting refill on hydromet cough syrup. Patient states only takes as needed.  Not on med list so wasn't sure if you would refill

## 2020-08-31 NOTE — TELEPHONE ENCOUNTER
Patient has a cough that is getting worse, medication that Dr  prescribed does  not seem to be working, daughter also states she has diverticulitis. She does not want to do a VV. Please call daughter and advise.  Thanks

## 2020-09-04 NOTE — PROGRESS NOTES
amLODIPine (NORVASC) 5 MG tablet take 1 tablet by mouth once daily 30 tablet 5    mometasone (ELOCON) 0.1 % cream Apply topically daily. 45 g 0    pantoprazole (PROTONIX) 40 MG tablet Take 1 tablet by mouth 2 times daily (before meals) 60 tablet 3    valACYclovir (VALTREX) 1 g tablet   0    ferrous sulfate 325 (65 Fe) MG tablet TAKE 1 TABLET BY MOUTH TWICE DAILY 60 tablet 5    losartan (COZAAR) 50 MG tablet Take 1 tablet by mouth daily 30 tablet 3    fluticasone (FLOVENT HFA) 220 MCG/ACT inhaler 2puffs twice per day      hydrochlorothiazide (HYDRODIURIL) 12.5 MG tablet Take 1 tablet by mouth daily 30 tablet 3    simvastatin (ZOCOR) 10 MG tablet Take 10 mg by mouth nightly.  metoclopramide (REGLAN) 10 MG tablet Take 10 mg by mouth 3 times daily.  gabapentin (NEURONTIN) 100 MG capsule Take 1 capsule by mouth 3 times daily for 180 days. Intended supply: 30 days 90 capsule 5     No current facility-administered medications for this visit.         No Known Allergies    Social History     Socioeconomic History    Marital status:      Spouse name: None    Number of children: None    Years of education: None    Highest education level: None   Occupational History    Occupation: retired     Employer: RETIRED   Social Needs    Financial resource strain: None    Food insecurity     Worry: None     Inability: None    Transportation needs     Medical: None     Non-medical: None   Tobacco Use    Smoking status: Never Smoker    Smokeless tobacco: Never Used   Substance and Sexual Activity    Alcohol use: No     Alcohol/week: 0.0 standard drinks    Drug use: No    Sexual activity: None   Lifestyle    Physical activity     Days per week: None     Minutes per session: None    Stress: None   Relationships    Social connections     Talks on phone: None     Gets together: None     Attends [employer] service: None     Active member of club or organization: None     Attends meetings of clubs or organizations: None     Relationship status: None    Intimate partner violence     Fear of current or ex partner: None     Emotionally abused: None     Physically abused: None     Forced sexual activity: None   Other Topics Concern    None   Social History Narrative    None       No family history on file. Review of Systems   Constitutional: Negative for activity change, appetite change, fatigue and fever. HENT: Positive for postnasal drip and rhinorrhea. Negative for congestion, ear pain, hearing loss, nosebleeds, sinus pressure and sore throat. Eyes: Negative for pain, redness, itching and visual disturbance. Respiratory: Positive for cough. Negative for apnea, chest tightness, shortness of breath and wheezing. Cardiovascular: Negative for chest pain, palpitations and leg swelling. Gastrointestinal: Negative for abdominal pain, blood in stool, constipation, diarrhea, nausea and vomiting. Endocrine: Negative. Genitourinary: Negative for decreased urine volume, difficulty urinating, dysuria, frequency, hematuria and urgency. Musculoskeletal: Negative for arthralgias, back pain, gait problem, myalgias and neck pain. Skin: Negative for color change and rash. Allergic/Immunologic: Negative for environmental allergies and food allergies. Neurological: Negative for dizziness, weakness, light-headedness, numbness and headaches. Hematological: Negative for adenopathy. Does not bruise/bleed easily. Psychiatric/Behavioral: Negative for behavioral problems, dysphoric mood and sleep disturbance. The patient is not nervous/anxious and is not hyperactive. All other systems reviewed and are negative. /70   Pulse 78   Temp 97.7 °F (36.5 °C)   Resp 16   Ht 5' (1.524 m)   Wt 116 lb (52.6 kg)   SpO2 98%   BMI 22.65 kg/m²     Physical Exam  Vitals signs and nursing note reviewed. Constitutional:       General: She is not in acute distress.      Appearance: Normal appearance. She is well-developed. HENT:      Head: Normocephalic and atraumatic. Right Ear: Hearing, tympanic membrane and external ear normal. No tenderness. No middle ear effusion. Left Ear: Hearing, tympanic membrane and external ear normal. No tenderness. No middle ear effusion. Nose: Nose normal. No congestion or rhinorrhea. Right Turbinates: Not enlarged. Left Turbinates: Not enlarged. Mouth/Throat:      Mouth: Mucous membranes are moist.      Tongue: No lesions. Pharynx: Oropharynx is clear. No oropharyngeal exudate or posterior oropharyngeal erythema. Eyes:      General: No scleral icterus. Conjunctiva/sclera: Conjunctivae normal.      Pupils: Pupils are equal, round, and reactive to light. Neck:      Musculoskeletal: Normal range of motion and neck supple. No neck rigidity or muscular tenderness. Thyroid: No thyromegaly. Cardiovascular:      Rate and Rhythm: Normal rate and regular rhythm. Heart sounds: Normal heart sounds. No murmur. Pulmonary:      Effort: Pulmonary effort is normal. No respiratory distress. Breath sounds: Normal breath sounds. No wheezing or rales. Abdominal:      General: Bowel sounds are normal. There is no distension. Palpations: Abdomen is soft. Tenderness: There is no abdominal tenderness. Musculoskeletal: Normal range of motion. General: No tenderness. Lymphadenopathy:      Cervical: No cervical adenopathy. Skin:     General: Skin is warm and dry. Findings: No erythema or rash. Neurological:      General: No focal deficit present. Mental Status: She is alert and oriented to person, place, and time. Cranial Nerves: No cranial nerve deficit. Deep Tendon Reflexes: Reflexes are normal and symmetric.  Reflexes normal.   Psychiatric:         Mood and Affect: Mood normal.                                 ASSESSMENT/PLAN:    Patient Active Problem List   Diagnosis    Iron deficiency anemia due to chronic blood loss    Near syncope    Essential hypertension    Acute kidney injury ([facility] [address]    Hypertension    Hyperlipidemia    Hernia, hiatal    GERD (gastroesophageal reflux disease)    Arthritis    Diarrhea    Hypokalemia    Primary insomnia    OC (onychocryptosis)       Doug Lovett was seen today for gastroesophageal reflux and cough. Diagnoses and all orders for this visit:    Chronic cough  -     HYDROcodone-homatropine (HYCODAN) 5-1.5 MG/5ML syrup; Take 5 mLs by mouth every 6 hours as needed (cough) for up to 7 days. Acute bronchitis, unspecified organism  -     benzonatate (TESSALON PERLES) 100 MG capsule; Take 1 capsule by mouth 3 times daily as needed for Cough  -     azithromycin (ZITHROMAX Z-NIR) 250 MG tablet; Use as directed    Other orders  -     loratadine (CLARITIN) 10 MG tablet; Take 1 tablet by mouth daily          Return if symptoms worsen or fail to improve. I spent 15 minutes with this patient. I spent greater than 50% of the time counseling this patient.         Kinsey Lee DO  9/4/2020  9:45 AM

## 2020-10-02 PROBLEM — E87.6 HYPOKALEMIA: Status: RESOLVED | Noted: 2018-01-18 | Resolved: 2020-01-01

## 2020-10-02 PROBLEM — R19.7 DIARRHEA: Status: RESOLVED | Noted: 2018-01-17 | Resolved: 2020-01-01

## 2020-10-02 NOTE — PROGRESS NOTES
throat, shortness of breath, weight loss or wheezing. She has tried prescription cough suppressant for the symptoms. The treatment provided significant relief. There is no history of environmental allergies. Patient Active Problem List   Diagnosis    Iron deficiency anemia due to chronic blood loss    Near syncope    Essential hypertension    Hypertension    Hyperlipidemia    Hernia, hiatal    GERD (gastroesophageal reflux disease)    Arthritis    Primary insomnia    OC (onychocryptosis)       Past Medical History:   Diagnosis Date    Arthritis     GERD (gastroesophageal reflux disease)     Hernia, hiatal     Hyperlipidemia     Hypertension        Past Surgical History:   Procedure Laterality Date    UPPER GASTROINTESTINAL ENDOSCOPY      UPPER GASTROINTESTINAL ENDOSCOPY  06/20/2016    1 biopsy       Current Outpatient Medications   Medication Sig Dispense Refill    predniSONE (DELTASONE) 20 MG tablet Take 1 tablet by mouth 2 times daily for 5 days 10 tablet 0    brompheniramine-pseudoephedrine-DM 2-30-10 MG/5ML syrup Take 5 mLs by mouth 4 times daily as needed for Congestion or Cough 240 mL 0    azithromycin (ZITHROMAX Z-NIR) 250 MG tablet Use as directed 6 tablet 0    loratadine (CLARITIN) 10 MG tablet Take 1 tablet by mouth daily 30 tablet 0    traZODone (DESYREL) 50 MG tablet Take 1 tablet by mouth nightly 30 tablet 2    amLODIPine (NORVASC) 5 MG tablet take 1 tablet by mouth once daily 30 tablet 5    mometasone (ELOCON) 0.1 % cream Apply topically daily.  45 g 0    pantoprazole (PROTONIX) 40 MG tablet Take 1 tablet by mouth 2 times daily (before meals) 60 tablet 3    valACYclovir (VALTREX) 1 g tablet   0    ferrous sulfate 325 (65 Fe) MG tablet TAKE 1 TABLET BY MOUTH TWICE DAILY 60 tablet 5    losartan (COZAAR) 50 MG tablet Take 1 tablet by mouth daily 30 tablet 3    fluticasone (FLOVENT HFA) 220 MCG/ACT inhaler 2puffs twice per day      hydrochlorothiazide (HYDRODIURIL) 12.5 MG tablet Take 1 tablet by mouth daily 30 tablet 3    simvastatin (ZOCOR) 10 MG tablet Take 10 mg by mouth nightly.  metoclopramide (REGLAN) 10 MG tablet Take 10 mg by mouth 3 times daily.  gabapentin (NEURONTIN) 100 MG capsule Take 1 capsule by mouth 3 times daily for 180 days. Intended supply: 30 days 90 capsule 5     No current facility-administered medications for this visit. Allergies   Allergen Reactions    Hydrocodone-Homatropine Itching       Social History     Socioeconomic History    Marital status:      Spouse name: None    Number of children: None    Years of education: None    Highest education level: None   Occupational History    Occupation: retired     Employer: RETIRED   Social Needs    Financial resource strain: None    Food insecurity     Worry: None     Inability: None    Transportation needs     Medical: None     Non-medical: None   Tobacco Use    Smoking status: Never Smoker    Smokeless tobacco: Never Used   Substance and Sexual Activity    Alcohol use: No     Alcohol/week: 0.0 standard drinks    Drug use: No    Sexual activity: None   Lifestyle    Physical activity     Days per week: None     Minutes per session: None    Stress: None   Relationships    Social connections     Talks on phone: None     Gets together: None     Attends Confucianist service: None     Active member of club or organization: None     Attends meetings of clubs or organizations: None     Relationship status: None    Intimate partner violence     Fear of current or ex partner: None     Emotionally abused: None     Physically abused: None     Forced sexual activity: None   Other Topics Concern    None   Social History Narrative    None       History reviewed. No pertinent family history. Review of Systems   Constitutional: Negative for activity change, appetite change, fatigue, fever and weight loss. HENT: Positive for postnasal drip.  Negative for congestion, ear pain, hearing loss, nosebleeds, rhinorrhea, sinus pressure and sore throat. Eyes: Negative for pain, redness, itching and visual disturbance. Respiratory: Positive for cough. Negative for apnea, chest tightness, shortness of breath and wheezing. Cardiovascular: Negative for chest pain, palpitations and leg swelling. Gastrointestinal: Positive for heartburn. Negative for abdominal pain, blood in stool, bowel incontinence, constipation, diarrhea, nausea and vomiting. Endocrine: Negative. Genitourinary: Negative for bladder incontinence, decreased urine volume, difficulty urinating, dysuria, frequency, hematuria and urgency. Musculoskeletal: Positive for back pain. Negative for arthralgias, gait problem, myalgias and neck pain. Skin: Negative for color change and rash. Allergic/Immunologic: Negative for environmental allergies and food allergies. Neurological: Negative for dizziness, tingling, weakness, light-headedness, numbness, headaches and paresthesias. Hematological: Negative for adenopathy. Does not bruise/bleed easily. Psychiatric/Behavioral: Negative for behavioral problems, dysphoric mood and sleep disturbance. The patient is not nervous/anxious and is not hyperactive. All other systems reviewed and are negative. /64   Pulse 67   Temp 97.3 °F (36.3 °C)   Resp 16   Ht 5' (1.524 m)   Wt 117 lb (53.1 kg)   SpO2 97%   BMI 22.85 kg/m²     Physical Exam  Vitals signs and nursing note reviewed. Constitutional:       General: She is not in acute distress. Appearance: Normal appearance. She is well-developed. HENT:      Head: Normocephalic and atraumatic. Right Ear: Hearing, tympanic membrane and external ear normal. No tenderness. No middle ear effusion. Left Ear: Hearing, tympanic membrane and external ear normal. No tenderness. No middle ear effusion. Nose: Nose normal. No congestion or rhinorrhea. Right Turbinates: Not enlarged.       Left without esophagitis    Acute bronchitis, unspecified organism  -     azithromycin (ZITHROMAX Z-NIR) 250 MG tablet; Use as directed    Chronic cough    Lumbar sprain, initial encounter    Allergic reaction to drug, initial encounter    Other orders  -     predniSONE (DELTASONE) 20 MG tablet; Take 1 tablet by mouth 2 times daily for 5 days  -     brompheniramine-pseudoephedrine-DM 2-30-10 MG/5ML syrup; Take 5 mLs by mouth 4 times daily as needed for Congestion or Cough  -     INFLUENZA, QUADV, ADJUVANTED, 65 YRS =, IM, PF, PREFILL SYR, 0.5ML (FLUAD)      Avoid Hycodan as it appears to be cause of allergic reaction  Follow up with Dr. oCx Min    Return if symptoms worsen or fail to improve. I spent 25 min with this patient. I spent greater than 50% of the time counseling this patient.         Mar Acevedo DO  10/2/2020  12:43 PM

## 2020-10-02 NOTE — PATIENT INSTRUCTIONS
Patient Education        Influenza (Flu) Vaccine (Inactivated or Recombinant): What You Need to Know  Why get vaccinated? Influenza vaccine can prevent influenza (flu). Flu is a contagious disease that spreads around the [place] every year, usually between October and May. Anyone can get the flu, but it is more dangerous for some people. Infants and young children, people 72years of age and older, pregnant women, and people with certain health conditions or a weakened immune system are at greatest risk of flu complications. Pneumonia, bronchitis, sinus infections and ear infections are examples of flu-related complications. If you have a medical condition, such as heart disease, cancer or diabetes, flu can make it worse. Flu can cause fever and chills, sore throat, muscle aches, fatigue, cough, headache, and runny or stuffy nose. Some people may have vomiting and diarrhea, though this is more common in children than adults. Each year, thousands of people in the [facility] die from flu, and many more are hospitalized. Flu vaccine prevents millions of illnesses and flu-related visits to the doctor each year. Influenza vaccine  CDC recommends everyone 10months of age and older get vaccinated every flu season. Children 6 months through 6years of age may need 2 doses during a single flu season. Everyone else needs only 1 dose each flu season. It takes about 2 weeks for protection to develop after vaccination. There are many flu viruses, and they are always changing. Each year a new flu vaccine is made to protect against three or four viruses that are likely to cause disease in the upcoming flu season. Even when the vaccine doesn't exactly match these viruses, it may still provide some protection. Influenza vaccine does not cause flu. Influenza vaccine may be given at the same time as other vaccines.   Talk with your health care provider  Tell your vaccine provider if the person getting the vaccine:  · Has had an allergic reaction after a previous dose of influenza vaccine, or has any severe, life-threatening allergies. · Has ever had Guillain-Barré Syndrome (also called GBS). In some cases, your health care provider may decide to postpone influenza vaccination to a future visit. People with minor illnesses, such as a cold, may be vaccinated. People who are moderately or severely ill should usually wait until they recover before getting influenza vaccine. Your health care provider can give you more information. Risks of a vaccine reaction  · Soreness, redness, and swelling where shot is given, fever, muscle aches, and headache can happen after influenza vaccine. · There may be a very small increased risk of Guillain-Barré Syndrome (GBS) after inactivated influenza vaccine (the flu shot). Sarbjit Muir children who get the flu shot along with pneumococcal vaccine (PCV13), and/or DTaP vaccine at the same time might be slightly more likely to have a seizure caused by fever. Tell your health care provider if a child who is getting flu vaccine has ever had a seizure. People sometimes faint after medical procedures, including vaccination. Tell your provider if you feel dizzy or have vision changes or ringing in the ears. As with any medicine, there is a very remote chance of a vaccine causing a severe allergic reaction, other serious injury, or death. What if there is a serious problem? An allergic reaction could occur after the vaccinated person leaves the clinic. If you see signs of a severe allergic reaction (hives, swelling of the face and throat, difficulty breathing, a fast heartbeat, dizziness, or weakness), call 9-1-1 and get the person to the nearest hospital.  For other signs that concern you, call your health care provider. Adverse reactions should be reported to the Vaccine Adverse Event Reporting System (VAERS).  Your health care provider will usually file this report, or you can do it yourself. Visit the VAERS website at www.vaers. hhs.gov or call 1-797.885.7301. VAERS is only for reporting reactions, and VAERS staff do not give medical advice. The National Vaccine Injury Compensation Program  The National Vaccine Injury Compensation Program (VICP) is a federal program that was created to compensate people who may have been injured by certain vaccines. Visit the VICP website at www.[facility].gov/vaccinecompensation or call 1-257.863.8475 to learn about the program and about filing a claim. There is a time limit to file a claim for compensation. How can I learn more? · Ask your healthcare provider. · Call your local or state health department. · Contact the Centers for Disease Control and Prevention (CDC):  ? Call 2-344.735.7705 (1-800-CDC-INFO) or  ? Visit CDC's website at www.cdc.gov/flu  Vaccine Information Statement (Interim)  Inactivated Influenza Vaccine  8/15/2019  42 EMILIA Guerra 784PA-07  Department of Health and Human Services  Centers for Disease Control and Prevention  Many Vaccine Information Statements are available in [place] and other languages. See www.immunize.org/vis. Muchas hojas de información sobre vacunas están disponibles en español y en otros idiomas. Visite www.immunize.org/vis. Care instructions adapted under license by [facility] ([facility]). If you have questions about a medical condition or this instruction, always ask your healthcare professional. [place] [address] any warranty or liability for your use of this information.

## 2020-11-24 NOTE — PROGRESS NOTES
Chief Complaint:   Chief Complaint   Patient presents with    Cough       Cough   This is a new problem. The current episode started in the past 7 days. The problem has been gradually worsening. The cough is non-productive. Pertinent negatives include no chest pain, headaches, myalgias, rash, sore throat, shortness of breath or wheezing. She has tried nothing for the symptoms. Fatigue   This is a new problem. The current episode started in the past 7 days. The problem occurs daily. Associated symptoms include coughing and fatigue. Pertinent negatives include no abdominal pain, arthralgias, chest pain, congestion, headaches, joint swelling, myalgias, nausea, rash, sore throat, vomiting or weakness.        Patient Active Problem List   Diagnosis    Iron deficiency anemia due to chronic blood loss    Near syncope    Hypertension    Hyperlipidemia    Hernia, hiatal    GERD (gastroesophageal reflux disease)    Arthritis    Primary insomnia    OC (onychocryptosis)       Past Medical History:   Diagnosis Date    Arthritis     GERD (gastroesophageal reflux disease)     Hernia, hiatal     Hyperlipidemia     Hypertension        Past Surgical History:   Procedure Laterality Date    UPPER GASTROINTESTINAL ENDOSCOPY      UPPER GASTROINTESTINAL ENDOSCOPY  06/20/2016    1 biopsy       Current Outpatient Medications   Medication Sig Dispense Refill    azithromycin (ZITHROMAX Z-NIR) 250 MG tablet Use as directed 6 tablet 0    benzonatate (TESSALON) 100 MG capsule Take 1 capsule by mouth 3 times daily as needed for Cough 30 capsule 0    brompheniramine-pseudoephedrine-DM 2-30-10 MG/5ML syrup Take 5 mLs by mouth 4 times daily as needed for Congestion or Cough 240 mL 0    azithromycin (ZITHROMAX Z-NIR) 250 MG tablet Use as directed 6 tablet 0    traZODone (DESYREL) 50 MG tablet Take 1 tablet by mouth nightly 30 tablet 2    amLODIPine (NORVASC) 5 MG tablet take 1 tablet by mouth once daily 30 tablet 5    gabapentin (NEURONTIN) 100 MG capsule Take 1 capsule by mouth 3 times daily for 180 days. Intended supply: 30 days 90 capsule 5    mometasone (ELOCON) 0.1 % cream Apply topically daily. 45 g 0    pantoprazole (PROTONIX) 40 MG tablet Take 1 tablet by mouth 2 times daily (before meals) 60 tablet 3    valACYclovir (VALTREX) 1 g tablet   0    ferrous sulfate 325 (65 Fe) MG tablet TAKE 1 TABLET BY MOUTH TWICE DAILY 60 tablet 5    losartan (COZAAR) 50 MG tablet Take 1 tablet by mouth daily 30 tablet 3    fluticasone (FLOVENT HFA) 220 MCG/ACT inhaler 2puffs twice per day      hydrochlorothiazide (HYDRODIURIL) 12.5 MG tablet Take 1 tablet by mouth daily 30 tablet 3    simvastatin (ZOCOR) 10 MG tablet Take 10 mg by mouth nightly.  metoclopramide (REGLAN) 10 MG tablet Take 10 mg by mouth 3 times daily. No current facility-administered medications for this visit.         Allergies   Allergen Reactions    Hydrocodone-Homatropine Itching       Social History     Socioeconomic History    Marital status:      Spouse name: Not on file    Number of children: Not on file    Years of education: Not on file    Highest education level: Not on file   Occupational History    Occupation: retired     Employer: RETIRED   Social Needs    Financial resource strain: Not on file    Food insecurity     Worry: Not on file     Inability: Not on file   [employer] needs     Medical: Not on file     Non-medical: Not on file   Tobacco Use    Smoking status: Never Smoker    Smokeless tobacco: Never Used   Substance and Sexual Activity    Alcohol use: No     Alcohol/week: 0.0 standard drinks    Drug use: No    Sexual activity: Not on file   Lifestyle    Physical activity     Days per week: Not on file     Minutes per session: Not on file    Stress: Not on file   Relationships    Social connections     Talks on phone: Not on file     Gets together: Not on file     Attends Restoration service: Not on file Active member of club or organization: Not on file     Attends meetings of clubs or organizations: Not on file     Relationship status: Not on file    Intimate partner violence     Fear of current or ex partner: Not on file     Emotionally abused: Not on file     Physically abused: Not on file     Forced sexual activity: Not on file   Other Topics Concern    Not on file   Social History Narrative    Not on file       No family history on file. Review of Systems   Constitutional: Positive for fatigue. HENT: Negative for congestion, facial swelling, hearing loss, nosebleeds, sinus pressure and sore throat. Eyes: Negative for photophobia and visual disturbance. Respiratory: Positive for cough. Negative for apnea, chest tightness, shortness of breath and wheezing. Cardiovascular: Negative for chest pain, palpitations and leg swelling. Gastrointestinal: Negative for abdominal pain, blood in stool, diarrhea, nausea and vomiting. Genitourinary: Negative for difficulty urinating, frequency and urgency. Musculoskeletal: Negative for arthralgias, back pain, joint swelling and myalgias. Skin: Negative for color change and rash. Allergic/Immunologic: Negative. Neurological: Negative for syncope, weakness, light-headedness and headaches. Hematological: Negative. Psychiatric/Behavioral: Negative for agitation, behavioral problems, confusion and self-injury. The patient is not nervous/anxious. All other systems reviewed and are negative. Physical Exam  Vitals signs and nursing note reviewed. Constitutional:       General: She is not in acute distress. Appearance: She is well-developed. HENT:      Head: Normocephalic and atraumatic. Nose: Congestion and rhinorrhea present. Eyes:      Conjunctiva/sclera: Conjunctivae normal.      Pupils: Pupils are equal, round, and reactive to light. Neck:      Musculoskeletal: Normal range of motion and neck supple.       Thyroid: No thyromegaly. Vascular: No JVD. Cardiovascular:      Rate and Rhythm: Normal rate and regular rhythm. Heart sounds: Normal heart sounds. No murmur. No friction rub. No gallop. Pulmonary:      Effort: Pulmonary effort is normal. No respiratory distress. Breath sounds: Rhonchi present. No wheezing. Abdominal:      General: Bowel sounds are normal. There is no distension. Palpations: Abdomen is soft. Tenderness: There is no abdominal tenderness. There is no guarding or rebound. Musculoskeletal: Normal range of motion. Lymphadenopathy:      Cervical: No cervical adenopathy. Skin:     General: Skin is warm and dry. Findings: No erythema or rash. Neurological:      Mental Status: She is alert and oriented to person, place, and time. Cranial Nerves: No cranial nerve deficit. Motor: No abnormal muscle tone. Coordination: Coordination normal.      Deep Tendon Reflexes: Reflexes are normal and symmetric. Psychiatric:         Behavior: Behavior normal.         Judgment: Judgment normal.                               ASSESSMENT/PLAN:    Kayla Ayala was seen today for cough. Diagnoses and all orders for this visit:    Suspected COVID-19 virus infection  -     Covid-19 Ambulatory; Future  -     XR CHEST (2 VW); Future  -     azithromycin (ZITHROMAX Z-NIR) 250 MG tablet; Use as directed  -     benzonatate (TESSALON) 100 MG capsule; Take 1 capsule by mouth 3 times daily as needed for Cough    Cough  -     XR CHEST (2 VW); Future  -     azithromycin (ZITHROMAX Z-NIR) 250 MG tablet; Use as directed  -     benzonatate (TESSALON) 100 MG capsule; Take 1 capsule by mouth 3 times daily as needed for Cough    Fatigue, unspecified type  -     CBC Auto Differential; Future  -     Comprehensive Metabolic Panel; Future  -     XR CHEST (2 VW); Future  -     TSH without Reflex;  Future            Spencer Aguirre DO    11/24/2020  10:12 AM

## 2020-11-27 PROBLEM — U07.1 COVID-19: Status: ACTIVE | Noted: 2020-01-01

## 2020-11-27 NOTE — TELEPHONE ENCOUNTER
Patient is Covid positive. She still has a persistent cough and daughter is asking if something could be called in to help with her cough. Please call in to [facility] in [place].

## 2020-11-27 NOTE — TELEPHONE ENCOUNTER
Shiraz Lyon calling back states she would like to speak directly with you regarding mom. She now feels like she has needles in her back and Shiraz Lyon was advised to take patient to emergency for further evaluation and she refused and asked for call from doctor.

## 2020-11-27 NOTE — ED NOTES
Oxygen titrated from 15l (o2 sat 98%) to 10l via nrb (o2 sat 96%) to 6l nasal cannula (o2 sat 89%), pt moved back to 10l nrb. Warren Coon RN  11/27/20 Meenu Delgado RN  11/27/20 [id number]

## 2020-11-27 NOTE — H&P
[facility] History and Physical      CHIEF COMPLAINT:  SOB x 4 days    History of Present Illness: This patient is an 81 y/o famile with a PMH of hypertension and HLD who presents to the ED with a 4 day h/o worsening HARRINGTON. She feels that she was exposed by a family member who was not experiencing symptoms. She has had cough with minimal mucous production. She notes significant back pain. The cough medication is not helping. No fever or chills. Informant(s) for H&P:patient    REVIEW OF SYSTEMS:  A comprehensive review of systems was negative except for: what is in the HPI      PMH:  Past Medical History:   Diagnosis Date    Arthritis     GERD (gastroesophageal reflux disease)     Hernia, hiatal     Hyperlipidemia     Hypertension        Surgical History:  Past Surgical History:   Procedure Laterality Date    UPPER GASTROINTESTINAL ENDOSCOPY      UPPER GASTROINTESTINAL ENDOSCOPY  06/20/2016    1 biopsy       Medications Prior to Admission:    Prior to Admission medications    Medication Sig Start Date End Date Taking?  Authorizing Provider   azithromycin (ZITHROMAX Z-NIR) 250 MG tablet Use as directed 11/24/20   Fam Cota, DO   benzonatate (TESSALON) 100 MG capsule Take 1 capsule by mouth 3 times daily as needed for Cough 11/24/20 12/1/20  Fam Cota, DO   cefdinir (OMNICEF) 300 MG capsule Take 1 capsule by mouth 2 times daily for 10 days 11/24/20 12/4/20  Fam Cota, DO   dexamethasone (DECADRON) 6 MG tablet Take 1 tablet by mouth daily (with breakfast) for 7 days 11/24/20 12/1/20  Fam Cota, DO   brompheniramine-pseudoephedrine-DM 2-30-10 MG/5ML syrup Take 5 mLs by mouth 4 times daily as needed for Congestion or Cough 10/2/20   Lucial Sprain, DO   azithromycin (ZITHROMAX Z-NIR) 250 MG tablet Use as directed 10/2/20   Lucila Sprain, DO   traZODone (DESYREL) 50 MG tablet Take 1 tablet by mouth nightly 8/26/20   Fam Cota, DO   amLODIPine (NORVASC) 5 MG tablet take 1 tablet by mouth once daily 6/4/20   Silvana Justine, DO   gabapentin (NEURONTIN) 100 MG capsule Take 1 capsule by mouth 3 times daily for 180 days. Intended supply: 30 days 1/10/20 7/8/20  Silvana Flnatasha, DO   mometasone (ELOCON) 0.1 % cream Apply topically daily. 7/15/19   Silvana Flnatasha, DO   pantoprazole (PROTONIX) 40 MG tablet Take 1 tablet by mouth 2 times daily (before meals) 7/11/19   Linwood Valencia, DO   valACYclovir (VALTREX) 1 g tablet  1/27/18   Historical Provider, MD   ferrous sulfate 325 (65 Fe) MG tablet TAKE 1 TABLET BY MOUTH TWICE DAILY 8/29/17   Silvanajacques Fletcher, DO   losartan (COZAAR) 50 MG tablet Take 1 tablet by mouth daily 8/29/17   Silvana Justine, DO   fluticasone (FLOVENT HFA) 220 MCG/ACT inhaler 2puffs twice per day 8/30/02   Historical Provider, MD   hydrochlorothiazide (HYDRODIURIL) 12.5 MG tablet Take 1 tablet by mouth daily 6/21/16   Tyler Kovacs, DO   simvastatin (ZOCOR) 10 MG tablet Take 10 mg by mouth nightly. Historical Provider, MD   metoclopramide (REGLAN) 10 MG tablet Take 10 mg by mouth 3 times daily. Historical Provider, MD       Allergies:    Hydrocodone-homatropine    Social History:    reports that she has never smoked. She has never used smokeless tobacco. She reports that she does not drink alcohol or use drugs. Family History:   family history is not on file.  none contributory      PHYSICAL EXAM:  Vitals:  BP (!) 118/57   Pulse 66   Temp 97.6 °F (36.4 °C) (Oral)   Resp 18   Ht 5' 2\" (1.575 m)   Wt 124 lb (56.2 kg)   SpO2 96%   BMI 22.68 kg/m²     General Appearance: alert and oriented to person, place and time and in no acute distress  Skin: warm and dry  Head: normocephalic and atraumatic  Eyes: pupils equal, round, and reactive to light, extraocular eye movements intact, conjunctivae normal  Neck: neck supple and non tender without mass   Pulmonary/Chest: reduced BS to auscultation bilaterally- no wheezes, rales or rhonchi, normal air movement, BP    4.  Mixed HLD- continue statin    5. CKD, GFR 47; appears at baseline  -  Continue to monitor kidney function  -  Encourage PO fluid intake    6. Leukocytosis  -  May be secondary to recent decadron use    Code Status: full  DVT prophylaxis: lovenox      NOTE: This report was transcribed using voice recognition software. Every effort was made to ensure accuracy; however, inadvertent computerized transcription errors may be present.   Electronically signed by Belkys Tom MD on 11/27/2020 at 4:23 PM

## 2020-11-27 NOTE — ED NOTES
Bed: 11  Expected date:   Expected time:   Means of arrival:   Comments:  EMS/SOB nely Hernandez RN  11/27/20 1257

## 2020-11-27 NOTE — ED PROVIDER NOTES
28-year-old female known history of COVID-19 recently diagnosed presents to the emergency department shortness of breath. Patient states this is worsened over the last week. She denies chest pain denies nausea vomiting denies fevers denies any other complaints at this time. Per nursing patient was placed on nonrebreather was initially de-escalating to 8 L nasal cannula but then placed back to a 10 L nonrebreather. The history is provided by the patient. Shortness of Breath   Severity:  Moderate  Onset quality:  Gradual  Duration:  1 week  Timing:  Intermittent  Progression:  Waxing and waning  Chronicity:  New  Context: URI    Relieved by:  Nothing  Worsened by:  Nothing  Ineffective treatments:  None tried  Associated symptoms: cough and headaches    Associated symptoms: no abdominal pain, no chest pain, no ear pain, no fever, no rash, no sore throat, no syncope, no swollen glands, no vomiting and no wheezing         Review of Systems   Constitutional: Negative for chills and fever. HENT: Negative for ear pain, sinus pressure and sore throat. Eyes: Negative for pain, discharge and redness. Respiratory: Positive for cough and shortness of breath. Negative for wheezing. Cardiovascular: Negative for chest pain and syncope. Gastrointestinal: Negative for abdominal distention, abdominal pain, diarrhea, nausea and vomiting. Genitourinary: Negative for dysuria and frequency. Musculoskeletal: Negative for arthralgias and back pain. Skin: Negative for rash and wound. Neurological: Positive for headaches. Negative for weakness. Hematological: Negative for adenopathy. All other systems reviewed and are negative. Physical Exam  Constitutional:       General: She is in acute distress. Appearance: She is well-developed. HENT:      Head: Normocephalic and atraumatic. Mouth/Throat:      Mouth: Mucous membranes are moist.   Neck:      Musculoskeletal: Normal range of motion. Cardiovascular:      Rate and Rhythm: Normal rate and regular rhythm. Pulmonary:      Effort: Tachypnea and respiratory distress present. Breath sounds: No decreased breath sounds, wheezing, rhonchi or rales. Abdominal:      General: Bowel sounds are normal.      Palpations: Abdomen is soft. Genitourinary:     Rectum: Guaiac result negative. Musculoskeletal: Normal range of motion. Skin:     General: Skin is warm. Capillary Refill: Capillary refill takes less than 2 seconds. Neurological:      General: No focal deficit present. Mental Status: She is alert and oriented to person, place, and time. Procedures     MDM  Number of Diagnoses or Management Options  Acute respiratory failure with hypoxia [facility]):   COVID-19:   Diagnosis management comments: Patient seen and examined. Labs and imaging were initiated. Patient is known to be Covid positive is in significant respiratory distress but seems to be stabilized on 10 L nonrebreather. Labs and imaging were ordered and reviewed after review of labs that showed no PE on CT of the chest the patient was admitted to hospitalist service Covid unit. Decadron was given.             --------------------------------------------- PAST HISTORY ---------------------------------------------  Past Medical History:  has a past medical history of Arthritis, GERD (gastroesophageal reflux disease), Hernia, hiatal, Hyperlipidemia, and Hypertension. Past Surgical History:  has a past surgical history that includes Upper gastrointestinal endoscopy and Upper gastrointestinal endoscopy (06/20/2016). Social History:  reports that she has never smoked. She has never used smokeless tobacco. She reports that she does not drink alcohol or use drugs. Family History: family history is not on file. The patients home medications have been reviewed.     Allergies: Hydrocodone-homatropine    -------------------------------------------------- RESULTS Result Value Ref Range    Date Analyzed [id number]     Time Analyzed 1411     Source: Blood Arterial     pH, Blood Gas 7.435 7.350 - 7.450    PCO2 25.6 (L) 35.0 - 45.0 mmHg    PO2 84.1 75.0 - 100.0 mmHg    HCO3 16.8 (L) 22.0 - 26.0 mmol/L    B.E. -6.0 (L) -3.0 - 3.0 mmol/L    O2 Sat 96.0 92.0 - 98.5 %    O2Hb 95.1 94.0 - 97.0 %    COHb 0.6 0.0 - 1.5 %    MetHb 0.3 0.0 - 1.5 %    O2 Content 15.6 mL/dL    HHb 4.0 0.0 - 5.0 %    tHb (est) 11.6 11.5 - 16.5 g/dL    Mode nrb 10L     Date Of Collection      Time Collected      Pt Temp 37.0 C     ID [id number]     Lab [id number]     Critical(s) Notified . No Critical Values    EKG 12 Lead   Result Value Ref Range    Ventricular Rate 69 BPM    Atrial Rate 69 BPM    P-R Interval 172 ms    QRS Duration 82 ms    Q-T Interval 370 ms    QTc Calculation (Bazett) 396 ms    P Axis 35 degrees    R Axis -3 degrees    T Axis 18 degrees       Radiology  Xr Chest (2 Vw)    Result Date: 11/24/2020  EXAMINATION: TWO XRAY VIEWS OF THE CHEST 11/24/2020 2:21 pm COMPARISON: January 18, 2018 HISTORY: ORDERING SYSTEM PROVIDED HISTORY: Cough FINDINGS: There is some patchy opacity in the left mid lung and at the right base which likely represent developing pneumonic infiltrates. Stable cardiomediastinal silhouette. Neither costophrenic angle appears blunted. Developing patchy pneumonic infiltrates bilaterally. Xr Chest Portable    Result Date: 11/27/2020  EXAMINATION: ONE XRAY VIEW OF THE CHEST 11/27/2020 1:15 pm COMPARISON: 24 November 2020 HISTORY: ORDERING SYSTEM PROVIDED HISTORY: shortness of breath, known covid TECHNOLOGIST PROVIDED HISTORY: Reason for exam:->shortness of breath, known covid FINDINGS: There is increasing bilateral airspace disease which is geographically much more extensive than on the prior study. Suspect that this represents progressive pneumonia. Cardiomediastinal silhouette is stable. Neither costophrenic angle is visibly blunted.     Notably worsening bilateral airspace disease which may relate to progressive pneumonia. Cta Pulmonary W Contrast    Result Date: 11/27/2020  EXAMINATION: CTA OF THE CHEST 11/27/2020 3:27 pm TECHNIQUE: CTA of the chest was performed after the administration of intravenous contrast.  Multiplanar reformatted images are provided for review. MIP images are provided for review. Dose modulation, iterative reconstruction, and/or weight based adjustment of the mA/kV was utilized to reduce the radiation dose to as low as reasonably achievable. COMPARISON: None. HISTORY: ORDERING SYSTEM PROVIDED HISTORY: Eval for PE, known COVID TECHNOLOGIST PROVIDED HISTORY: Reason for exam:->Eval for PE, known COVID FINDINGS: Pulmonary Arteries: Pulmonary arteries are adequately opacified for evaluation. No evidence of intraluminal filling defect to suggest pulmonary embolism. Main pulmonary artery is normal in caliber. Mediastinum: No evidence of mediastinal lymphadenopathy. The heart is mildly enlarged. There is no pericardial effusion. Lungs/pleura: Multifocal bilateral ground-glass pulmonary infiltrates are noted most consistent with COVID-19 pneumonia. There is no pleural effusion. Upper Abdomen: Limited images of the upper abdomen are unremarkable. Soft Tissues/Bones: No acute bone or soft tissue abnormality. 1. There is no evidence of a pulmonary embolus. 2. Multifocal bilateral ground-glass pulmonary infiltrates most consistent with COVID-19 pneumonia. EKG: This EKG is signed and interpreted by me. Rate: 69  Rhythm: Sinus  Interpretation: NSR, PVCs  Comparison: stable as compared to patient's most recent EKG      ------------------------- NURSING NOTES AND VITALS REVIEWED ---------------------------  Date / Time Roomed:  11/27/2020  1:46 PM  ED Bed Assignment:  11/11    The nursing notes within the ED encounter and vital signs as below have been reviewed.    Patient Vitals for the past 24 hrs:   BP Temp Temp src Pulse Resp SpO2 Height Weight 11/27/20 1632 -- -- -- 70 18 95 % -- --   11/27/20 1450 (!) 118/57 -- -- 66 18 -- -- --   11/27/20 1431 -- -- -- 70 18 96 % -- --   11/27/20 1341 (!) 119/56 97.6 °F (36.4 °C) Oral 72 18 (!) 88 % 5' 2\" (1.575 m) 124 lb (56.2 kg)       Oxygen Saturation Interpretation: Improved after treatment      ------------------------------------------ PROGRESS NOTES ------------------------------------------R    I have spoken with the patient and discussed todays results, in addition to providing specific details for the plan of care and counseling regarding the diagnosis and prognosis. Their questions are answered at this time and they are agreeable with the plan.      --------------------------------- ADDITIONAL PROVIDER NOTES ---------------------------------  Please note that the withdrawal or failure to initiate urgent interventions for this patient would likely result in a life threatening deterioration or permanent disability. Accordingly this patient received 30 minutes of critical care time, excluding separately billable procedures. Clinical Impression  1. COVID-19    2. Acute respiratory failure with hypoxia (HCC)          Disposition  Patient's disposition: Admit to telemetry  Patient's condition is fair.            Enedina Rodriguez DO  11/27/20 8699

## 2020-11-28 NOTE — ED NOTES
Spoke to Cyrus Brothers, 6s, ext [phone], confirmed sbar. Pt prepared for transport via cart and monitor.      Ed Santamaria RN  11/27/20 1922

## 2020-11-28 NOTE — PROGRESS NOTES
medications with hold parameters. -  Monitor BP     4. Mixed HLD- continue statin     5. CKD, GFR 47; appears at baseline  -  Continue to monitor kidney function  -  Encourage PO fluid intake     6.  Leukocytosis  -  May be secondary to recent decadron use     Code Status: full  DVT prophylaxis: lovenox      NOTE: This report was transcribed using voice recognition software. Every effort was made to ensure accuracy; however, inadvertent computerized transcription errors may be present.   Electronically signed by Lexi Man MD on 11/28/2020 at 1:42 PM

## 2020-11-29 NOTE — PROGRESS NOTES
This report was transcribed using voice recognition software. Every effort was made to ensure accuracy; however, inadvertent computerized transcription errors may be present.   Electronically signed by Ivory Britton MD on 11/29/2020 at 12:38 PM

## 2020-11-29 NOTE — PROGRESS NOTES
Pulmonology consult sent via answering service. Dr. Samuel schneider. This RN checked medication list and saw that the orders for the medications were not linked with pharmacy. This RN called in per Dr. Doran's order:  -Rx Levothyroxine 75mcg tablet, quantity of 90 tablets with 3 refills, take 1 tablet by mouth every morning.  -Rx Losartan 100mg tablet, quantity 90 tablets with 3 refills, take 1 tablet by mouth daily.  -Rx Allopurinol 300 mg tablet, quantity 90 tablets with 3 refills, take 1 tablet by mouth daily.  -Rx Omeprazole 10 mg capsules, quantity 90 capsules with 0 refills, take 1 capsule by mouth daily.  -Rx Hydralazine 25mg tablet, quantity 30 tablets with 5 refills, take 1 tablet by mouth daily.  -Rx Metoprolol Tartrate 50 mg tablets, quantity 180 tablets with 3 refills, take 1 tablet by mouth 2 times daily.   Orders were verified with Pharmacist with readback.   Pt was also notified.   Dr. Doran to be notified.

## 2020-11-29 NOTE — CONSULTS
Pulmonary Consultation    Admit Date: 11/27/2020  Requesting Physician: King Alecia DO    CC: COVID 19 Pneumonia    HPI:  This patient is an 79 y/o female with a PMH of hypertension and HLD, non smoker who presents to the ED with a 4 day h/o worsening HARRINGTON along with stabbing back pain that presented suddenly. She feels that she was exposed by her daughter whom she lives with, who was not experiencing symptoms. She has a cough with minimal clear mucous production. No fever or chills. She moved here from the [facility] in [address] [facility]. There is a language barrier. Her breathing is stable today and she has SOB sometimes. Her cough is the same. Afebrile. She is on 13L HFNC with O2 sats 91-97%. PMH:    Past Medical History:   Diagnosis Date    Arthritis     GERD (gastroesophageal reflux disease)     Hernia, hiatal     Hyperlipidemia     Hypertension      PSH:   Past Surgical History:   Procedure Laterality Date    UPPER GASTROINTESTINAL ENDOSCOPY      UPPER GASTROINTESTINAL ENDOSCOPY  06/20/2016    1 biopsy        Family History: Unclear  Respiratory ROS: SOB, cough with mucous. Otherwise, a complete review of systems is undertaken and is negative.     Social History:  · Alcohol:   No ETOH  · Tobacco:  Non smoker  · Employment: no silica or asbestos exposure  Medications:     heparin (PORCINE) Infusion 16 Units/kg/hr (11/29/20 0817)      ferrous sulfate  325 mg Oral BID    fluticasone  2 puff Inhalation BID    gabapentin  100 mg Oral TID    pantoprazole  40 mg Oral BID AC    atorvastatin  10 mg Oral Daily    traZODone  50 mg Oral Nightly    sodium chloride flush  10 mL Intravenous 2 times per day    dexamethasone  6 mg Oral Daily    losartan  50 mg Oral Daily    hydroCHLOROthiazide  12.5 mg Oral Daily    cefTRIAXone (ROCEPHIN) IV  1 g Intravenous Q24H    remdesivir IVPB  100 mg Intravenous Q24H         Vitals:  Tmax:  VITALS:  /61   Pulse 64   Temp 98.2 °F (36.8 °C) (Oral)   Resp Pneumonia  · CKD      Plan:  · Continue O2 at 13L for now, wean to keep pox >88%. · Continue remdesivir Day #3  · Continue Decadron 6mg  Day #3  · Procal 0.08, CRP 6.0, Sed rate 55, Fibringen >700, D-dimer 2679. Will follow inflammatory markers  · Stop Heparin drip - change to Lovenox therapeutic dose  · Stop Flovent  · Will order IS and encouraged proning      Thanks for letting us see this patient in consultation. Please contact us with any questions. CARMEN Aguirre-CNP      Seen and examined, old records, meds, labs and imaging reviewed. She has acute hypoxic respiratory failure from COVID with high D dimer. Agree with dexamethasone and remdesivir. Change to Lovenox therapeutic dose to minimize lab draws. Agree with NP note above.

## 2020-11-30 NOTE — PROGRESS NOTES
Pulmonary Progress Note    Admit Date: 2020                            PCP: Phillip Álvarez DO  Active Problems:    COVID-19  Resolved Problems:    * No resolved hospital problems. *      Subjective:  Resting in bed on 15L and NRB. Feels winded with exertion and takes a long time to recover out of 80's. Denies cough or other complaints     Medications:       enoxaparin  1 mg/kg Subcutaneous Q12H    ferrous sulfate  325 mg Oral BID    gabapentin  100 mg Oral TID    pantoprazole  40 mg Oral BID AC    atorvastatin  10 mg Oral Daily    traZODone  50 mg Oral Nightly    sodium chloride flush  10 mL Intravenous 2 times per day    dexamethasone  6 mg Oral Daily    losartan  50 mg Oral Daily    hydroCHLOROthiazide  12.5 mg Oral Daily    cefTRIAXone (ROCEPHIN) IV  1 g Intravenous Q24H    remdesivir IVPB  100 mg Intravenous Q24H       Vitals:  VITALS:  BP (!) 106/50   Pulse 65   Temp 97.7 °F (36.5 °C) (Oral)   Resp 20   Ht 5' 2\" (1.575 m)   Wt 124 lb (56.2 kg)   SpO2 92%   BMI 22.68 kg/m²   24HR INTAKE/OUTPUT:  No intake or output data in the 24 hours ending 20 0946  CURRENT PULSE OXIMETRY:  SpO2: 92 %  24HR PULSE OXIMETRY RANGE:  SpO2  Av %  Min: 92 %  Max: 92 %  CVP:    VENT SETTINGS:   Vent Information  SpO2: 92 %  Additional Respiratory  Assessments  Pulse: 65  Resp: 20  SpO2: 92 %      EXAM:  General: Alert, in NAD  ENT: No discharge. Pharynx clear. membranes moist   Neck: thin, Trachea midline. Resp: No accessory muscle use. Non-labored. Lungs clear diminished with No crackles. No wheezing. No rhonchi. CV: Regular rate. Regular rhythm. No murmur . No edema. ABD: Non-tender. Non-distended. Soft, round . Normal bowel sounds. Skin: Warm and dry. M/S: No cyanosis. No joint deformity. No clubbing. Neuro: Awake. Follows commands. O x 3, HOLLINS  Psych:  calm and interactive     I/O: I/O last 3 completed shifts: In: 10 [I.V.:10]  Out: -   No intake/output data recorded. Results:  CBC:   Recent Labs     11/27/20  1403 11/28/20  0630 11/29/20  0530   WBC 19.5* 18.9* 16.6*   HGB 11.5 10.6* 10.7*   HCT 35.5 32.7* 32.6*   MCV 79.2* 77.7* 77.8*    175 203     BMP:   Recent Labs     11/27/20  1403 11/28/20  0630    141   K 4.5 4.5    108*   CO2 22 22   BUN 23 22   CREATININE 1.1* 1.0     LFT:   Recent Labs     11/28/20  0630   ALKPHOS 73   ALT 29   AST 22   PROT 5.7*   BILITOT <0.2   LABALBU 2.8*     PT/INR:   Recent Labs     11/27/20  1403   PROTIME 10.1   INR 0.9     Procalcitonin:   Recent Labs     11/28/20  0630   PROCAL 0.08     Cultures:  Results for Diana Lerner (MRN [id number]) as of 11/30/2020 09:48   Ref. Range 11/24/2020 10:01   SARS-CoV-2 Latest Ref Range: Not Detected  Detected (A)         ABG:   Recent Labs     11/27/20  1411   PH 7.435   PO2 84.1   PCO2 25.6*   HCO3 16.8*   BE -6.0*   O2SAT 96.0       Films:  Xr Chest Portable    Result Date: 11/27/2020  EXAMINATION: ONE XRAY VIEW OF THE CHEST 11/27/2020 1:15 pm COMPARISON: 24 November 2020 HISTORY: ORDERING SYSTEM PROVIDED HISTORY: shortness of breath, known covid TECHNOLOGIST PROVIDED HISTORY: Reason for exam:->shortness of breath, known covid FINDINGS: There is increasing bilateral airspace disease which is geographically much more extensive than on the prior study. Suspect that this represents progressive pneumonia. Cardiomediastinal silhouette is stable. Neither costophrenic angle is visibly blunted. Notably worsening bilateral airspace disease which may relate to progressive pneumonia.         Cta Pulmonary W Contrast    Result Date: 11/27/2020  EXAMINATION: CTA OF THE CHEST 11/27/2020 3:27 pm TECHNIQUE: CTA of the chest was performed after the administration of intravenous contrast.  Multiplanar reformatted images are provided for review. MIP images are provided for review.  Dose modulation, iterative reconstruction, and/or weight based adjustment of the mA/kV was utilized to reduce the radiation dose to as low as reasonably achievable. COMPARISON: None. HISTORY: ORDERING SYSTEM PROVIDED HISTORY: Eval for PE, known COVID TECHNOLOGIST PROVIDED HISTORY: Reason for exam:->Eval for PE, known COVID FINDINGS: Pulmonary Arteries: Pulmonary arteries are adequately opacified for evaluation. No evidence of intraluminal filling defect to suggest pulmonary embolism. Main pulmonary artery is normal in caliber. Mediastinum: No evidence of mediastinal lymphadenopathy. The heart is mildly enlarged. There is no pericardial effusion. Lungs/pleura: Multifocal bilateral ground-glass pulmonary infiltrates are noted most consistent with COVID-19 pneumonia. There is no pleural effusion. Upper Abdomen: Limited images of the upper abdomen are unremarkable. Soft Tissues/Bones: No acute bone or soft tissue abnormality. 1. There is no evidence of a pulmonary embolus. 2. Multifocal bilateral ground-glass pulmonary infiltrates most consistent with COVID-19 pneumonia.           Assessment:  · Acute respiratory failure with hypoxia  · COVID 19 Pneumonia  · CKD        Plan:  · Continue O2 , wean to keep pox >88%. Currently requiring 15LNC and 100% NRB, dropped into 80's and could not recover. Continue to wean back down as able, notify if O2 needs continue to increase    ·  Continue remdesivir Day #4  · Continue Decadron 6mg daily   Day #4  · Procal 0.08, on rocephin    · CRP 6.0, Sed rate 55, Fibringen >700, D-dimer 2679. Will follow inflammatory markers  · Continue  Lovenox therapeutic dose  · Stop Flovent  · Encourage  IS   ·  encouraged proning  · Strict isolation      Electronically signed by CARMEN Parnell on 11/30/2020 at 9:46 AM      Attending Attestation Note:    Patient seen. I agree with above.     In addition, the following apply:    - wean oxygen as able but now on 15L O2 NC with NRB  - supportive care to continue  - [address] [place] [address]  11/30/2020  11:31 AM

## 2020-11-30 NOTE — PROGRESS NOTES
[facility] Quality Flow/Interdisciplinary Rounds Progress Note        Quality Flow Rounds held on November 30, 2020    Disciplines Attending:  Bedside Nurse, ,  and Nursing Unit Leadership    Prachi Sotomayor was admitted on 11/27/2020  1:46 PM    Anticipated Discharge Date:       Disposition:    Yao Score:  Yao Scale Score: 23    Readmission Risk              Risk of Unplanned Readmission:        13           Discussed patient goal for the day, patient clinical progression, and barriers to discharge.   The following Goal(s) of the Day/Commitment(s) have been identified:  iv remdesivir, lovenox bid, rocephin q24      Jaimee Svetlana  November 30, 2020

## 2020-11-30 NOTE — PROGRESS NOTES
This nurse noted patient continuous Spo2 level drop to 78% upon entering patients room no distress was noted and high flow NC in place at 13L. Patient placed on 15L and Spo2 would not rise above 83%. Non rebreather was then used in place of NC and Spo2 leves noted between 87%-89%. Respiratory was called to floor. Secondary source of oxgyen supplied. Patient is currently on 15L high flow NC and 15L non rebreather with Spo2 levels 89%-91%. Per respiratory evaluate patients Spo2 levels in 20 minutes to possibly titrate oxygen down.

## 2020-11-30 NOTE — PROGRESS NOTES
[facility] Progress Note    Admitting Date and Time: 11/27/2020  1:46 PM  Admit Dx: [id number] [U07.1]  COVID-19 [U07.1]    Subjective:  Patient is being followed for COVID-19 [U07.1]  COVID-19 [U07.1]   Pt feels worse today. She is having a hard time breathing. She wants to know why her lungs are not working. Per RN: patient still desaturating with any ambulation. She has been increased to 15 L NRb mask plus high flow NC    ROS: denies fever, chills, cp, sob, n/v, HA unless stated above. Remains afebrile.      enoxaparin  1 mg/kg Subcutaneous Q12H    ferrous sulfate  325 mg Oral BID    gabapentin  100 mg Oral TID    pantoprazole  40 mg Oral BID AC    atorvastatin  10 mg Oral Daily    traZODone  50 mg Oral Nightly    sodium chloride flush  10 mL Intravenous 2 times per day    dexamethasone  6 mg Oral Daily    losartan  50 mg Oral Daily    hydroCHLOROthiazide  12.5 mg Oral Daily    cefTRIAXone (ROCEPHIN) IV  1 g Intravenous Q24H    remdesivir IVPB  100 mg Intravenous Q24H     sodium chloride flush, 10 mL, PRN  benzonatate, 100 mg, TID PRN  sodium chloride flush, 10 mL, PRN  acetaminophen, 650 mg, Q6H PRN    Or  acetaminophen, 650 mg, Q6H PRN  polyethylene glycol, 17 g, Daily PRN  promethazine, 12.5 mg, Q6H PRN    Or  ondansetron, 4 mg, Q6H PRN  guaiFENesin-dextromethorphan, 5 mL, Q4H PRN  sodium chloride, 30 mL, PRN         Objective:    BP (!) 106/50   Pulse 65   Temp 97.7 °F (36.5 °C) (Oral)   Resp 20   Ht 5' 2\" (1.575 m)   Wt 124 lb (56.2 kg)   SpO2 92%   BMI 22.68 kg/m²     General Appearance: alert and oriented to person, place and time and in no acute distress; very weak  Skin: warm and dry  Head: normocephalic and atraumatic  Eyes: pupils equal, round, and reactive to light, extraocular eye movements intact, conjunctivae normal  Neck: neck supple and non tender without mass   Pulmonary/Chest: reduced to auscultation bilaterally- no wheezes, rales or rhonchi, normal air movement, no respiratory distress  Cardiovascular: normal rate, normal S1 and S2 and no carotid bruits  Abdomen: soft, non-tender, non-distended, normal bowel sounds, no masses or organomegaly  Extremities: no cyanosis, no clubbing and no edema  Neurologic: no cranial nerve deficit and speech normal    Recent Labs     11/28/20  0630      K 4.5   *   CO2 22   BUN 22   CREATININE 1.0   GLUCOSE 172*   CALCIUM 8.8       Recent Labs     11/28/20  0630 11/29/20  0530   WBC 18.9* 16.6*   RBC 4.21 4.19   HGB 10.6* 10.7*   HCT 32.7* 32.6*   MCV 77.7* 77.8*   MCH 25.2* 25.5*   MCHC 32.4 32.8   RDW 15.4* 15.2*    203   MPV 10.8 11.0       Radiology:   CTA PULMONARY W CONTRAST   Final Result   1. There is no evidence of a pulmonary embolus. 2. Multifocal bilateral ground-glass pulmonary infiltrates most consistent   with COVID-19 pneumonia.       XR CHEST PORTABLE   Final Result   Notably worsening bilateral airspace disease which may relate to progressive   pneumonia.             EKG: Narrative & Impression      Sinus rhythm with occasional premature ventricular complexes  Otherwise normal ECG  When compared with ECG of 27-NOV-2020 13:58,  premature ventricular complexes are now present       Assessment:    Active Problems:    COVID-19  Resolved Problems:    * No resolved hospital problems. *      Plan:  1. Acute respiratory failure with hypoxia 2/2 Ellenayush Javier Co-V2- patient requiring 10 L O2 NRB mask on admission.   O2 saturation dropped to 88 % on RA at rest.  -  Decadron x 10 days  -  remdesivir x 5 days-pharmacy consulted     2.  COVID pneumonia with possible superimposed bacterial pneumonia (based on CXR)  -  Tessalon and robitussin for cough  -  Wean O2 as tolerated  -  Supportive care  -  Check Ag for strep and legionella  -  Monitor inflammatory markers  -  Heparin infusion per protocol  -  Pulmonology consulted for any further recommendations     3.  Essential hypertension-BP on the low side in

## 2020-11-30 NOTE — CARE COORDINATION
Social Work/Discharge Planning:  Chart reviewed. COVID positive 11/24. Patient is currently requiring 13 liters of oxygen. Called patient daughter Rubi Green (ph: [phone]) and left a voicemail in regards to discharge planning. Will continue to follow. Electronically signed by JESSICA Gaytan on 11/30/2020 at 10:24 AM    Addendum:  Patient carolina Green returned call. Informed her that this worker followed up with  regarding her discussion of discharge planning. Will continue to follow.   Electronically signed by JESSICA Gaytan on 11/30/2020 at 10:50 AM

## 2020-11-30 NOTE — CARE COORDINATION
CM NOTE: Per QFR---Covid positive in droplet plus isolation. Hypoxic episode this am. Currently using O2 13L which she does not have at home. Day #4 IV RDV. Continue lovenox, decadron, IV rocephin. Pulmonology following---IS, encourage proning. Stop flovent. CM attempted to reach pt by phone---no answer. CM left VM for daughter Jose Ordonez) to discuss transition of care. Await return call. CM received call from pt's daughter Jose Ordonez) & discussed role, anticipated LOS & current plan of care. Reports pt lives with her in 1 story home but is independent & gets around without the use of equipment. Is able to use steps when needed. Does not use O2 & is not diabetic. No hx SILVIO or HHC. Discussed dx, current medications & use of O2. Declines DME list & states to use any home medical agency covered by pt's insurance. Plan is home with [facility]. Declines need for HHC. Will likely need home O2--- CM & SW will continue to follow pt.

## 2020-12-01 NOTE — PROGRESS NOTES
normal S1 and S2 and no carotid bruits  Abdomen: soft, non-tender, non-distended, normal bowel sounds, no masses or organomegaly  Extremities: no cyanosis, no clubbing and no edema  Neurologic: no cranial nerve deficit and speech normal    No results for input(s): NA, K, CL, CO2, BUN, CREATININE, GLUCOSE, CALCIUM in the last 72 hours. Recent Labs     11/29/20  0530 12/01/20  0405   WBC 16.6* 9.7   RBC 4.19 4.55   HGB 10.7* 11.4*   HCT 32.6* 36.5   MCV 77.8* 80.2   MCH 25.5* 25.1*   MCHC 32.8 31.2*   RDW 15.2* 15.7*    223   MPV 11.0 10.6       Radiology:   CTA PULMONARY W CONTRAST   Final Result   1. There is no evidence of a pulmonary embolus. 2. Multifocal bilateral ground-glass pulmonary infiltrates most consistent   with COVID-19 pneumonia.       XR CHEST PORTABLE   Final Result   Notably worsening bilateral airspace disease which may relate to progressive   pneumonia.             EKG: Narrative & Impression      Sinus rhythm with occasional premature ventricular complexes  Otherwise normal ECG  When compared with ECG of 27-NOV-2020 13:58,  premature ventricular complexes are now present       Assessment:    Active Problems:    COVID-19    Acute respiratory failure with hypoxia (HCC)    Pneumonia due to COVID-19 virus    Community acquired pneumonia of right upper lobe of lung    Stage 3a chronic kidney disease  Resolved Problems:    * No resolved hospital problems. *      Plan:  1. Acute respiratory failure with hypoxia 2/2 Yi Valles Co-V2- patient requiring 10 L O2 NRB mask on admission.   O2 saturation dropped to 88 % on RA at rest.  -  Now currently on 15L NRB mask  -  Decadron x 10 days (day 5)  -  remdesivir x 5 days-pharmacy consulted (day 5)  -  Pulmonology on board-appreciate their recommendations     2.  COVID pneumonia with possible superimposed bacterial pneumonia (based on CXR)  -  Tessalon and robitussin for cough  -  Wean O2 as tolerated  -  Supportive care  -  Ag for strep and legionella still lpending  -  continue to Monitor inflammatory markers  -  therapeutic lovenox  -  Pulmonology consulted for any further recommendations     3.  Essential hypertension-BP on the low side in ED  -  Will order home BP medications with hold parameters. -  Monitor BP     4. Mixed HLD- continue statin     5. CKD, GFR 47; appears at baseline  -  Continue to monitor kidney function  -  Encourage PO fluid intake     6.  Leukocytosis-improved  -  May be secondary to recent decadron use     Code Status: full  DVT prophylaxis: lovenox      NOTE: This report was transcribed using voice recognition software. Every effort was made to ensure accuracy; however, inadvertent computerized transcription errors may be present.   Electronically signed by Akira Wang MD on 12/1/2020 at 8:34 AM

## 2020-12-01 NOTE — PROGRESS NOTES
Pulmonary Progress Note    Admit Date: 2020                            PCP: Taisha Maya DO  Active Problems:    COVID-19    Acute respiratory failure with hypoxia ([address]    Pneumonia due to COVID-19 virus    Community acquired pneumonia of right upper lobe of lung    Stage 3a chronic kidney disease  Resolved Problems:    * No resolved hospital problems. *      Subjective:  Resting in bed on 15L and NRB. Feeling about the same today. Still winded at times. Medications:       dexamethasone  6 mg Oral Daily    enoxaparin  1 mg/kg Subcutaneous Q12H    ferrous sulfate  325 mg Oral BID    gabapentin  100 mg Oral TID    pantoprazole  40 mg Oral BID AC    atorvastatin  10 mg Oral Daily    traZODone  50 mg Oral Nightly    sodium chloride flush  10 mL Intravenous 2 times per day    losartan  50 mg Oral Daily    hydroCHLOROthiazide  12.5 mg Oral Daily    cefTRIAXone (ROCEPHIN) IV  1 g Intravenous Q24H    remdesivir IVPB  100 mg Intravenous Q24H       Vitals:  VITALS:  /70   Pulse 68   Temp 97 °F (36.1 °C) (Axillary)   Resp 20   Ht 5' 2\" (1.575 m)   Wt 124 lb (56.2 kg)   SpO2 97%   BMI 22.68 kg/m²   24HR INTAKE/OUTPUT:      Intake/Output Summary (Last 24 hours) at 2020 0932  Last data filed at 2020 2207  Gross per 24 hour   Intake 300 ml   Output --   Net 300 ml     CURRENT PULSE OXIMETRY:  SpO2: 97 %  24HR PULSE OXIMETRY RANGE:  SpO2  Av %  Min: 90 %  Max: 97 %  CVP:    VENT SETTINGS:   Vent Information  Skin Assessment: Clean, dry, & intact  SpO2: 97 %  Additional Respiratory  Assessments  Pulse: 68  Resp: 20  SpO2: 97 %      EXAM:  General: Alert, in NAD  ENT: No discharge. Pharynx clear. membranes moist   Neck: thin, Trachea midline. Resp: No accessory muscle use. Non-labored. Lungs clear diminished with No crackles. No wheezing. No rhonchi. CV: Regular rate. Regular rhythm. No murmur . No edema. ABD: Non-tender. Non-distended. Soft, round . Normal bowel sounds. Skin: Warm and dry. M/S: No cyanosis. No joint deformity. No clubbing. Neuro: Awake. Follows commands. O x 3, HOLLINS  Psych:  calm and interactive     I/O: I/O last 3 completed shifts: In: 300 [I.V.:300]  Out: -   No intake/output data recorded. Results:  CBC:   Recent Labs     11/29/20  0530 12/01/20  0405   WBC 16.6* 9.7   HGB 10.7* 11.4*   HCT 32.6* 36.5   MCV 77.8* 80.2    223     BMP:   No results for input(s): NA, K, CL, CO2, PHOS, BUN, CREATININE in the last 72 hours. Invalid input(s): CA  LFT:   No results for input(s): ALKPHOS, ALT, AST, PROT, BILITOT, BILIDIR, LABALBU in the last 72 hours. PT/INR:   No results for input(s): PROTIME, INR in the last 72 hours. Procalcitonin:   No results for input(s): PROCAL in the last 72 hours. Cultures:  Results for Holli Dose (MRN [id number]) as of 11/30/2020 09:48   Ref. Range 11/24/2020 10:01   SARS-CoV-2 Latest Ref Range: Not Detected  Detected (A)         ABG:   No results for input(s): PH, PO2, PCO2, HCO3, BE, O2SAT in the last 72 hours. Films:  Xr Chest Portable    Result Date: 11/27/2020  EXAMINATION: ONE XRAY VIEW OF THE CHEST 11/27/2020 1:15 pm COMPARISON: 24 November 2020 HISTORY: ORDERING SYSTEM PROVIDED HISTORY: shortness of breath, known covid TECHNOLOGIST PROVIDED HISTORY: Reason for exam:->shortness of breath, known covid FINDINGS: There is increasing bilateral airspace disease which is geographically much more extensive than on the prior study. Suspect that this represents progressive pneumonia. Cardiomediastinal silhouette is stable. Neither costophrenic angle is visibly blunted. Notably worsening bilateral airspace disease which may relate to progressive pneumonia.         Cta Pulmonary W Contrast    Result Date: 11/27/2020  EXAMINATION: CTA OF THE CHEST 11/27/2020 3:27 pm TECHNIQUE: CTA of the chest was performed after the administration of intravenous contrast.  Multiplanar reformatted images are provided for review. MIP images are provided for review. Dose modulation, iterative reconstruction, and/or weight based adjustment of the mA/kV was utilized to reduce the radiation dose to as low as reasonably achievable. COMPARISON: None. HISTORY: ORDERING SYSTEM PROVIDED HISTORY: Eval for PE, known COVID TECHNOLOGIST PROVIDED HISTORY: Reason for exam:->Eval for PE, known COVID FINDINGS: Pulmonary Arteries: Pulmonary arteries are adequately opacified for evaluation. No evidence of intraluminal filling defect to suggest pulmonary embolism. Main pulmonary artery is normal in caliber. Mediastinum: No evidence of mediastinal lymphadenopathy. The heart is mildly enlarged. There is no pericardial effusion. Lungs/pleura: Multifocal bilateral ground-glass pulmonary infiltrates are noted most consistent with COVID-19 pneumonia. There is no pleural effusion. Upper Abdomen: Limited images of the upper abdomen are unremarkable. Soft Tissues/Bones: No acute bone or soft tissue abnormality. 1. There is no evidence of a pulmonary embolus. 2. Multifocal bilateral ground-glass pulmonary infiltrates most consistent with COVID-19 pneumonia.           Assessment:  · Acute respiratory failure with hypoxia  · COVID 19 Pneumonia  · CKD        Plan:  · Continue O2 , wean to keep pox >88%. Currently requiring 15LNC and 100% NRB. Continue to wean back down as able, notify if O2 needs continue to increase    ·  Continue remdesivir Day #5  · Continue Decadron 6mg daily   Day #5  · Procal 0.08, on rocephin    · CRP 6.0, Sed rate 55, Fibringen >700, D-dimer 2679. Will follow inflammatory markers  · Continue  Lovenox therapeutic dose  · Stop Flovent  · Encourage  IS   ·  encouraged proning  · Strict isolation      Electronically signed by CARMEN Bolanos on 12/1/2020 at 9:31 AM    Attending Attestation Note:    Patient seen and examined with NP. I agree with above.     In addition, the following apply:    - O2, IS, wean O2 as able  - supportive care to continue   - Continue remdesivir Day #5  - Continue Decadron 6mg daily   Day #5    Vivian Levy  12/1/2020  3:05 PM

## 2020-12-02 NOTE — PROGRESS NOTES
normal S1 and S2 and no carotid bruits  Abdomen: soft, non-tender, non-distended, normal bowel sounds, no masses or organomegaly  Extremities: no cyanosis, no clubbing and no edema  Neurologic: no cranial nerve deficit and speech normal    No results for input(s): NA, K, CL, CO2, BUN, CREATININE, GLUCOSE, CALCIUM in the last 72 hours. Recent Labs     12/01/20  0405   WBC 9.7   RBC 4.55   HGB 11.4*   HCT 36.5   MCV 80.2   MCH 25.1*   MCHC 31.2*   RDW 15.7*      MPV 10.6       Radiology:   CTA PULMONARY W CONTRAST   Final Result   1. There is no evidence of a pulmonary embolus. 2. Multifocal bilateral ground-glass pulmonary infiltrates most consistent   with COVID-19 pneumonia.       XR CHEST PORTABLE   Final Result   Notably worsening bilateral airspace disease which may relate to progressive   pneumonia.             EKG: Narrative & Impression      Sinus rhythm with occasional premature ventricular complexes  Otherwise normal ECG  When compared with ECG of 27-NOV-2020 13:58,  premature ventricular complexes are now present       Assessment:    Active Problems:    COVID-19    Acute respiratory failure with hypoxia (HCC)    Pneumonia due to COVID-19 virus    Community acquired pneumonia of right upper lobe of lung    Stage 3a chronic kidney disease  Resolved Problems:    * No resolved hospital problems. *      Plan:  1. Acute respiratory failure with hypoxia 2/2 Angeles Keena Co-V2- patient requiring 10 L O2 NRB mask on admission.   O2 saturation dropped to 88 % on RA at rest.  -  Now currently on 15L NRB mask  -  Decadron x 10 days (day 6)  -  remdesivir x 5 days-pharmacy consulted  -  Pulmonology on board-appreciate their recommendations     2.  COVID pneumonia with possible superimposed bacterial pneumonia (based on CXR)  -  Tessalon and robitussin for cough  -  Wean O2 as tolerated  -  Supportive care  -  Ag for strep and legionella still lpending  -  continue to Monitor inflammatory markers  -  therapeutic

## 2020-12-02 NOTE — PROGRESS NOTES
11/28/2020 06:30   Sodium Latest Ref Range: 132 - 146 mmol/L 141   Potassium Latest Ref Range: 3.5 - 5.0 mmol/L 4.5   Chloride Latest Ref Range: 98 - 107 mmol/L 108 (H)   CO2 Latest Ref Range: 22 - 29 mmol/L 22   BUN Latest Ref Range: 8 - 23 mg/dL 22   Creatinine Latest Ref Range: 0.5 - 1.0 mg/dL 1.0   Anion Gap Latest Ref Range: 7 - 16 mmol/L 11   GFR Non- Latest Ref Range: >=60 mL/min/1.73 52   GFR African American Unknown >60   Glucose Latest Ref Range: 74 - 99 mg/dL 172 (H)   Calcium Latest Ref Range: 8.6 - 10.2 mg/dL 8.8   Total Protein Latest Ref Range: 6.4 - 8.3 g/dL 5.7 (L)   Procalcitonin Latest Ref Range: 0.00 - 0.08 ng/mL 0.08   Albumin Latest Ref Range: 3.5 - 5.2 g/dL 2.8 (L)   Alk Phos Latest Ref Range: 35 - 104 U/L 73   ALT Latest Ref Range: 0 - 32 U/L 29   AST Latest Ref Range: 0 - 31 U/L 22   Bilirubin Latest Ref Range: 0.0 - 1.2 mg/dL <0.2   WBC Latest Ref Range: 4.5 - 11.5 E9/L 18.9 (H)   RBC Latest Ref Range: 3.50 - 5.50 E12/L 4.21   Hemoglobin Quant Latest Ref Range: 11.5 - 15.5 g/dL 10.6 (L)   Hematocrit Latest Ref Range: 34.0 - 48.0 % 32.7 (L)   MCV Latest Ref Range: 80.0 - 99.9 fL 77.7 (L)   MCH Latest Ref Range: 26.0 - 35.0 pg 25.2 (L)   MCHC Latest Ref Range: 32.0 - 34.5 % 32.4   MPV Latest Ref Range: 7.0 - 12.0 fL 10.8   RDW Latest Ref Range: 11.5 - 15.0 fL 15.4 (H)   Platelet Count Latest Ref Range: 130 - 450 E9/L 175   Neutrophils % Latest Ref Range: 43.0 - 80.0 % 94.8 (H)   Lymphocyte % Latest Ref Range: 20.0 - 42.0 % 2.6 (L)   Monocytes % Latest Ref Range: 2.0 - 12.0 % 2.6   Eosinophils % Latest Ref Range: 0.0 - 6.0 % 0.0   Basophils % Latest Ref Range: 0.0 - 2.0 % 0.0   Neutrophils Absolute Latest Ref Range: 1.80 - 7.30 E9/L 17.96 (H)   Lymphocytes Absolute Latest Ref Range: 1.50 - 4.00 E9/L 0.57 (L)   Monocytes Absolute Latest Ref Range: 0.10 - 0.95 E9/L 0.57   Eosinophils Absolute Latest Ref Range: 0.05 - 0.50 E9/L 0.00 (L)   Basophils Absolute Latest Ref Range: 0.00 - 0.20 E9/L 0.00   Nucleated Red Blood Cells Latest Units: /100 WBC 0.0   Poikilocytes Unknown 1+   Ovalocytes Unknown 1+   Miranda Cells Unknown 1+   Ferritin Latest Units: ng/mL 289   Fibrinogen Latest Ref Range: 225 - 540 mg/dL >700 (H)   D-Dimer, Quant Latest Units: ng/mL DDU 2679   Procalcitonin:   No results for input(s): PROCAL in the last 72 hours. Cultures:  Results for Mary Grover (MRN [id number]) as of 11/30/2020 09:48   Ref. Range 11/24/2020 10:01   SARS-CoV-2 Latest Ref Range: Not Detected  Detected (A)         ABG:   No results for input(s): PH, PO2, PCO2, HCO3, BE, O2SAT in the last 72 hours. Films:  Xr Chest Portable    Result Date: 11/27/2020  EXAMINATION: ONE XRAY VIEW OF THE CHEST 11/27/2020 1:15 pm COMPARISON: 24 November 2020 HISTORY: ORDERING SYSTEM PROVIDED HISTORY: shortness of breath, known covid TECHNOLOGIST PROVIDED HISTORY: Reason for exam:->shortness of breath, known covid FINDINGS: There is increasing bilateral airspace disease which is geographically much more extensive than on the prior study. Suspect that this represents progressive pneumonia. Cardiomediastinal silhouette is stable. Neither costophrenic angle is visibly blunted. Notably worsening bilateral airspace disease which may relate to progressive pneumonia.         Cta Pulmonary W Contrast    Result Date: 11/27/2020  EXAMINATION: CTA OF THE CHEST 11/27/2020 3:27 pm TECHNIQUE: CTA of the chest was performed after the administration of intravenous contrast.  Multiplanar reformatted images are provided for review. MIP images are provided for review. Dose modulation, iterative reconstruction, and/or weight based adjustment of the mA/kV was utilized to reduce the radiation dose to as low as reasonably achievable. COMPARISON: None.  HISTORY: ORDERING SYSTEM PROVIDED HISTORY: Eval for PE, known COVID TECHNOLOGIST PROVIDED HISTORY: Reason for exam:->Eval for PE, known COVID FINDINGS: Pulmonary Arteries: Pulmonary arteries

## 2020-12-03 NOTE — PROGRESS NOTES
(ROBITUSSIN DM) 100-10 MG/5ML syrup 5 mL, 5 mL, Oral, Q4H PRN, Niki Lopez MD    losartan (COZAAR) tablet 50 mg, 50 mg, Oral, Daily, Niki Lopez MD, 50 mg at 12/03/20 0808    hydroCHLOROthiazide (HYDRODIURIL) tablet 12.5 mg, 12.5 mg, Oral, Daily, Niki Lopez MD, 12.5 mg at 12/03/20 0808    cefTRIAXone (ROCEPHIN) 1 g in sterile water 10 mL IV syringe, 1 g, Intravenous, Q24H, Niki Lopez MD, 1 g at 12/02/20 2343    0.9 % sodium chloride bolus, 30 mL, Intravenous, PRN, Niki Lopez MD  BP (!) 107/53   Pulse 65   Temp 99.1 °F (37.3 °C) (Oral)   Resp 18   Ht 5' 2\" (1.575 m)   Wt 124 lb (56.2 kg)   SpO2 92%   BMI 22.68 kg/m²     General: No distress  Skin: No rash  Eyes:  Clear sclera  Nose: Normal nares  Mouth:  Teeth and palate normal  Neck: No masses or nodes  Chest: Symmetric, no accessory use  Heart: RRR  Lungs: Diminished but clear  Abdomen: Soft, nt, nd, no hsm  Extremities: No edema  No intake or output data in the 24 hours ending 12/03/20 1346  CBC with Differential:    Lab Results   Component Value Date    WBC 8.1 12/03/2020    RBC 4.75 12/03/2020    HGB 11.9 12/03/2020    HCT 37.1 12/03/2020     12/03/2020    MCV 78.1 12/03/2020    MCH 25.1 12/03/2020    MCHC 32.1 12/03/2020    RDW 14.6 12/03/2020    NRBC 0.0 11/28/2020    LYMPHOPCT 2.6 11/28/2020    MONOPCT 2.6 11/28/2020    BASOPCT 0.0 11/28/2020    MONOSABS 0.57 11/28/2020    LYMPHSABS 0.57 11/28/2020    EOSABS 0.00 11/28/2020    BASOSABS 0.00 11/28/2020     BMP:    Lab Results   Component Value Date     12/03/2020    K 4.2 12/03/2020    K 4.5 11/28/2020     12/03/2020    CO2 26 12/03/2020    BUN 27 12/03/2020    LABALBU 2.3 12/03/2020    CREATININE 1.0 12/03/2020    CALCIUM 8.6 12/03/2020    GFRAA >60 12/03/2020    LABGLOM 52 12/03/2020    GLUCOSE 148 12/03/2020       IMPRESSION:   Acute hypoxic respiratory failure  COVID 19 pneumonia  No better, can probably stop ceftriaxone after today as has 7 days of therapy. Completed remdesivir. Continue dexamethasone. Wean O2 as able.   Reche Query  12/3/2020  1:46 PM

## 2020-12-03 NOTE — PROGRESS NOTES
Some fibrous rales. Shallow respirations at times. Labored. 15 L oxygen to a nonrebreather mask. Dry intermittent cough  Cardiovascular: normal rate, normal S1 and S2 and no carotid bruits  Abdomen: soft, non-tender, non-distended, normal bowel sounds, no masses or organomegaly  -Pure wick  Extremities: no cyanosis, no clubbing and no edema  Neurologic: no cranial nerve deficit and speech normal  Hep-Lock      Recent Labs     12/03/20  0445      K 4.2      CO2 26   BUN 27*   CREATININE 1.0   GLUCOSE 148*   CALCIUM 8.6       Recent Labs     12/01/20  0405 12/03/20  0445   WBC 9.7 8.1   RBC 4.55 4.75   HGB 11.4* 11.9   HCT 36.5 37.1   MCV 80.2 78.1*   MCH 25.1* 25.1*   MCHC 31.2* 32.1   RDW 15.7* 14.6    287   MPV 10.6 11.2     11/25/2020  5:07 PM - Yunior, Mhy Incoming Results From [facility]     Specimen Information:  Nasopharyngeal Swab          Component  Value  Ref Range & Units  Status  Collected  Lab    SARS-CoV-2  DetectedAbnormal    Not Detected  Final  11/24/2020 10:01 AM  352        Labs 12/2/2020  D-dimer 875  CRP 10.7    11/28/2020   ferritin 289  Procalcitonin -0.08  Fibrinogen 700        Radiology:   CTA chest 11/27/2020  Impression:          1. There is no evidence of a pulmonary embolus. 2. Multifocal bilateral ground-glass pulmonary infiltrates most consistent   with COVID-19 pneumonia. Assessment:    Active Problems:    COVID-19    Acute respiratory failure with hypoxia (HCC)    Pneumonia due to COVID-19 virus    Community acquired pneumonia of right upper lobe of lung    Stage 3a chronic kidney disease  Resolved Problems:    * No resolved hospital problems. *      Plan:  1. Acute hypoxic failure respiratory to COVID-19, + 11/25  -Titrating oxygen.   Recent 15 L nonrebreather----it looks like 15 L high flow +15 L O2  -Continue with Decadron day 7 out of 10  -Remdesivir x5 days-pharmacy on  -Pulmonary consult  -Treatments        COVID-19 viral pneumonia with possible superimposed bacterial pneumonia  -Chest x-ray noted  -Ceftriaxone day 7  -Continue supportive care with oxygen  -Tessalon for cough  -Follow urine and strep Legionella      Essential hypertension  -Continue home medications, parameters  -Cozaar, hydrochlorothiazide    Hyperlipidemia  -Continue Lipitor          CODE STATUS full  DVT prophylaxis Lovenox  Diet General diet  PPI    NOTE: This report was transcribed using voice recognition software. Every effort was made to ensure accuracy; however, inadvertent computerized transcription errors may be present.   Electronically signed by CARMEN Cerna on 12/3/2020 at 8:08 AM

## 2020-12-04 NOTE — PROGRESS NOTES
Katrin Hoover, patients daughter, called in demanding that someone leave the floor to go get her mothers food. She had the  call us twice stating this is unacceptable and she would bypass the  to bring her food here. I explained to her that it is impossible to have someone leave the floor to come and get her mothers food the moment she drops it off. I assured her that we have means to heat the food in the event that it gets cold. She started yelling at me telling me that [place] food is crap, her mother has been here for over a week and wants to know if she is supposed to just let her die. I tried to explain, through her yelling at me, that by pulling someone out of a patients room to run downstairs to get food is taking away from much needed patient care and that is not acceptable to do. I also explained that everyone on the floor is extremely busy and not always able to take the multiple calls from family as they come in. She asked that I call the attending to have him call her, I assured her that there is a message placed on her mothers chart for the doctor to call her. She stated she called the governor to report this, I explained to her that the [place] is not going to put orders in for nurses to stop patient care to  food. She ended the call by telling me that we are all doing a great job and asked that I leave a message for her nurse today, Fariha Lopez, to call her back. I discussed this conversation with Fariha Lopez, as other staff and physicians were present in the nurses station at the time.

## 2020-12-04 NOTE — PROGRESS NOTES
[facility] Progress Note    Admitting Date and Time: 11/27/2020  1:46 PM  Admit Dx: [id number] [U07.1]  COVID-19 [U07.1]    Subjective:  Patient is being followed for COVID-19 [U07.1]  COVID-19 [U07.1]   Pt resting in bed comfortably on 15 L high flow with 15 L bleed to nonrebreather mask. Nurse informed me today that she had potential fall this morning she was on the floor in the bathroom next to the commode no noted head injury patient with no other evidence of trauma stated her legs were little bit sore  Per RN: Reported fall to me    ROS: denies fever, chills, cp, +sob, n/v, HA unless stated above.   Sore bilateral upper legs hip area     dexamethasone  6 mg Oral Daily    enoxaparin  1 mg/kg Subcutaneous Q12H    ferrous sulfate  325 mg Oral BID    gabapentin  100 mg Oral TID    pantoprazole  40 mg Oral BID AC    atorvastatin  10 mg Oral Daily    traZODone  50 mg Oral Nightly    sodium chloride flush  10 mL Intravenous 2 times per day    losartan  50 mg Oral Daily    hydroCHLOROthiazide  12.5 mg Oral Daily    cefTRIAXone (ROCEPHIN) IV  1 g Intravenous Q24H     sodium chloride flush, 10 mL, PRN  benzonatate, 100 mg, TID PRN  sodium chloride flush, 10 mL, PRN  acetaminophen, 650 mg, Q6H PRN    Or  acetaminophen, 650 mg, Q6H PRN  polyethylene glycol, 17 g, Daily PRN  promethazine, 12.5 mg, Q6H PRN    Or  ondansetron, 4 mg, Q6H PRN  guaiFENesin-dextromethorphan, 5 mL, Q4H PRN  sodium chloride, 30 mL, PRN         Objective:    BP (!) 120/59   Pulse 66   Temp 98 °F (36.7 °C) (Oral)   Resp 18   Ht 5' 2\" (1.575 m)   Wt 124 lb (56.2 kg)   SpO2 94%   BMI 22.68 kg/m²     General Appearance: alert and oriented to person, place and time and in no acute distress  Skin: warm and dry  Head: normocephalic and atraumatic  Eyes: pupils equal, round, and reactive to light, extraocular eye movements intact, conjunctivae normal  Neck: neck supple and non tender without mass   Pulmonary/Chest: Some fibrous rales. Shallow respirations at times. Labored. 15 L oxygen to a nonrebreather mask. Dry intermittent cough  Cardiovascular: normal rate, normal S1 and S2 and no carotid bruits  Abdomen: soft, non-tender, non-distended, normal bowel sounds, no masses or organomegaly  -Pure wick  Extremities: no cyanosis, no clubbing and no edema  Neurologic: no cranial nerve deficit and speech normal  Hep-Lock      Recent Labs     12/03/20  0445 12/04/20  0305    137   K 4.2 3.8    100   CO2 26 28   BUN 27* 28*   CREATININE 1.0 0.9   GLUCOSE 148* 138*   CALCIUM 8.6 8.5*       Recent Labs     12/03/20  0445 12/04/20  0305   WBC 8.1 8.0   RBC 4.75 4.65   HGB 11.9 11.5   HCT 37.1 36.1   MCV 78.1* 77.6*   MCH 25.1* 24.7*   MCHC 32.1 31.9*   RDW 14.6 14.6    286   MPV 11.2 10.3     11/25/2020  5:07 PM - Yunior, Mhy Incoming Results From [facility]     Specimen Information:  Nasopharyngeal Swab          Component  Value  Ref Range & Units  Status  Collected  Lab    SARS-CoV-2  DetectedAbnormal    Not Detected  Final  11/24/2020 10:01 AM  352        Labs 12/2/2020  D-dimer 875  CRP 10.7    11/28/2020   ferritin 289  Procalcitonin -0.08  Fibrinogen 700        Radiology:   CTA chest 11/27/2020  Impression:          1. There is no evidence of a pulmonary embolus. 2. Multifocal bilateral ground-glass pulmonary infiltrates most consistent   with COVID-19 pneumonia. Assessment:    Active Problems:    COVID-19    Acute respiratory failure with hypoxia (HCC)    Pneumonia due to COVID-19 virus    Community acquired pneumonia of right upper lobe of lung    Stage 3a chronic kidney disease  Resolved Problems:    * No resolved hospital problems. *      Plan:  1. Acute hypoxic failure respiratory to COVID-19, + 11/25  -Titrating oxygen.   Recent 15 L nonrebreather----it looks like 15 L high flow +15 L O2  -Continue with Decadron day 8 out of 10  -Remdesivir x5 days-pharmacy on--done 12/2/20  -Pulmonary consult, notes

## 2020-12-04 NOTE — PROGRESS NOTES
approx 0930 pt was heard yelling from her room; a nurse passing in the [place]d heard the patient and upon entering the room the pt was found on the floor next to her bedside commode; pt was returned to her bed by the nurse as well as a PCA; vital signs were obtained and pt was assessed for injury; pt states that she did NOT hit her head or obtain any other injury to her body; bedside commode was placed away from her bed and the bed alarm was turned on; pt was educated on the importance of safety and advised that she must call the nurse or PCA before getting out of bed; pt verbalized understanding; Vanda Anthony NP was notified

## 2020-12-04 NOTE — PROGRESS NOTES
Continues on NRB and 15 liters, mild dyspnea and mild cough. Pox 96%.        Vent settings:Vent Information  Skin Assessment: Clean, dry, & intact  SpO2: 94 %  Additional Respiratory  Assessments  Pulse: 66  Resp: 18  SpO2: 94 %      Current Facility-Administered Medications:     dexamethasone (DECADRON) tablet 6 mg, 6 mg, Oral, Daily, Julio Caraballo MD, 6 mg at 12/03/20 0807    enoxaparin (LOVENOX) injection 60 mg, 1 mg/kg, Subcutaneous, Q12H, CARMEN Toussaint CNP, 60 mg at 12/04/20 0411    sodium chloride flush 0.9 % injection 10 mL, 10 mL, Intravenous, PRN, Rodney Barlow DO    benzonatate (TESSALON) capsule 100 mg, 100 mg, Oral, TID PRN, Julio Caraballo MD, 100 mg at 11/29/20 0816    ferrous sulfate (IRON 325) tablet 325 mg, 325 mg, Oral, BID, Julio Caraballo MD, 325 mg at 12/03/20 2342    gabapentin (NEURONTIN) capsule 100 mg, 100 mg, Oral, TID, Julio Caraballo MD, 100 mg at 12/03/20 2342    pantoprazole (PROTONIX) tablet 40 mg, 40 mg, Oral, BID AC, Julio Caraballo MD, 40 mg at 12/04/20 6292    atorvastatin (LIPITOR) tablet 10 mg, 10 mg, Oral, Daily, Julio Caraballo MD, 10 mg at 12/03/20 0807    traZODone (DESYREL) tablet 50 mg, 50 mg, Oral, Nightly, Julio Caraballo MD, 50 mg at 12/03/20 2342    sodium chloride flush 0.9 % injection 10 mL, 10 mL, Intravenous, 2 times per day, Julio Caraballo MD, 10 mL at 12/03/20 2343    sodium chloride flush 0.9 % injection 10 mL, 10 mL, Intravenous, PRN, Julio Caraballo MD    acetaminophen (TYLENOL) tablet 650 mg, 650 mg, Oral, Q6H PRN, 650 mg at 12/02/20 2343 **OR** acetaminophen (TYLENOL) suppository 650 mg, 650 mg, Rectal, Q6H PRN, Julio Caraballo MD    polyethylene glycol (GLYCOLAX) packet 17 g, 17 g, Oral, Daily PRN, Julio Caraballo MD    promethazine (PHENERGAN) tablet 12.5 mg, 12.5 mg, Oral, Q6H PRN **OR** ondansetron (ZOFRAN) injection 4 mg, 4 mg, Intravenous, Q6H PRN, Julio Caraballo MD    guaiFENesin-dextromethorphan [facility] ceftriaxone. Completed remdesivir. Continue dexamethasone. On Lovenox for elevated d-dimer  Wean O2 for pox > 90%. Encourage incentive spirometer use.   Repeat CXR    Brittany Connolly, [facility]  12/4/2020  8:25 AM

## 2020-12-04 NOTE — CARE COORDINATION
COVID POSITIVE 11/24. Requiring O2 15 liters high flow and also 15 liters [place] [facility] [phone] given referral to follow- will need O2 testing / order on discharge. Plan remains to return home w/ daughter Nydia Jeff w/  [facility] and private duty help; referral called to elmer at ph [phone] fax [phone]. Orders on chart, faxed per Elmer's request per previous CM. Received Remdesivir, on po Decadron, iv abx.  Will follow Nghia Hansen

## 2020-12-04 NOTE — CARE COORDINATION
Social Work / Discharge Planning :COVID POSITIVE: Plan at discharge is HOME with family [facility] [place] [id number]. Patient NOW on 30 heated high flow from 15 liters. Await needs at discharge. . SW to follow.  Electronically signed by JESSICA Roberts on 12/4/20 at 11:41 AM EST

## 2020-12-04 NOTE — PROGRESS NOTES
Comprehensive Nutrition Assessment    Type and Reason for Visit:  Initial, RD Nutrition Re-Screen/LOS    Nutrition Recommendations/Plan: Start Ensure twice daily    Nutrition Assessment:  79 yo female admitted with COVID-19 and presents with CKD III. Malnutrition status unknown due to lack of data. Will order supplement due to suspected poor po. Malnutrition Assessment:  Malnutrition Status:  Insufficient data    Context:  Acute Illness     Findings of the 6 clinical characteristics of malnutrition:  Energy Intake:  Unable to assess with limited data  Weight Loss:  No significant weight loss     Body Fat Loss:  Unable to assess     Muscle Mass Loss:  Unable to assess    Fluid Accumulation:  No significant fluid accumulation     Strength:  Not Performed    Estimated Daily Nutrient Needs:  Energy (kcal):  1846-6467 kcals (25-30 kcals/kg); Weight Used for Energy Requirements:  Current     Protein (g):  56-67 gm pro (1.0-1.2 gm pro/kg); Weight Used for Protein Requirements:  Current        Fluid (ml/day):  9442-4696 ml; Method Used for Fluid Requirements:  1 ml/kcal      Nutrition Related Findings:  Abdomen soft and non-tender. BS WNL. No edema noted. PO 0% and 1-25% at 2 meals recorded, however MD noted pt with good appetite. Meds reviewed: Gluc 132-172, CRP 6.0 (11/27) and 10/7 (12/2). Meds include: Fe, HCTZ.       Wounds:  None       Current Nutrition Therapies:    Diet: General    Anthropometric Measures:  · Height: 5' 2\" (157.5 cm)  · Current Body Weight: 124 lb (56.2 kg)(11/27)   · Admission Body Weight: 124 lb (56.2 kg)    · Usual Body Weight: 122 lb (55.3 kg)(average wts over past year - no methods noted)     · Ideal Body Weight: 110 lbs; % Ideal Body Weight 112.7 %   · BMI: 22.7  · BMI Categories: Normal Weight (BMI 22.0 to 24.9) age over 72       Nutrition Diagnosis:   · Predicted inadequate energy intake related to impaired respiratory function as evidenced by intake 0-25%, intake 26-50%(Limited data)      Nutrition Interventions:   Food and/or Nutrient Delivery:  Continue Current Diet, Start Oral Nutrition Supplement  Nutrition Education/Counseling:  No recommendation at this time   Coordination of Nutrition Care:  Continue to monitor while inpatient    Goals:  PO % of meals and supplement. No significant wt loss.        Nutrition Monitoring and Evaluation:   Behavioral-Environmental Outcomes:  None Identified   Food/Nutrient Intake Outcomes:  Food and Nutrient Intake, Supplement Intake  Physical Signs/Symptoms Outcomes:  Biochemical Data, Nutrition Focused Physical Findings, Weight     Discharge Planning:    Continue current diet     Electronically signed by Griffin Dougherty RD, LD on 12/4/20 at 4:03 PM EST    Contact: [phone]

## 2020-12-05 NOTE — PROGRESS NOTES
MD    guaiFENesin-dextromethorphan (ROBITUSSIN DM) 100-10 MG/5ML syrup 5 mL, 5 mL, Oral, Q4H PRN, Delio Serra MD    losartan (COZAAR) tablet 50 mg, 50 mg, Oral, Daily, Delio Serra MD, 50 mg at 12/04/20 0840    hydroCHLOROthiazide (HYDRODIURIL) tablet 12.5 mg, 12.5 mg, Oral, Daily, Delio Serra MD, 12.5 mg at 12/04/20 0840    0.9 % sodium chloride bolus, 30 mL, Intravenous, PRN, Delio Serra MD  BP (!) 98/54   Pulse 83   Temp 97.7 °F (36.5 °C) (Axillary)   Resp 24   Ht 5' 2\" (1.575 m)   Wt 124 lb (56.2 kg)   SpO2 94%   BMI 22.68 kg/m²     General: No distress  Skin: No rash  Eyes:  Clear sclera  Nose: Normal nares  Mouth:  Teeth and palate normal  Neck: No masses or nodes  Chest: Symmetric, no accessory use  Heart: RRR  Lungs: Diminished with bilateral crackles, no wheezing/rhonchi  Abdomen: Soft, nt, nd, no hsm  Extremities: No edema    No intake or output data in the 24 hours ending 12/05/20 1206  CBC with Differential:    Lab Results   Component Value Date    WBC 8.4 12/05/2020    RBC 4.32 12/05/2020    HGB 11.0 12/05/2020    HCT 33.4 12/05/2020     12/05/2020    MCV 77.3 12/05/2020    MCH 25.5 12/05/2020    MCHC 32.9 12/05/2020    RDW 14.6 12/05/2020    NRBC 0.0 11/28/2020    LYMPHOPCT 6.2 12/05/2020    MONOPCT 3.5 12/05/2020    BASOPCT 0.0 12/05/2020    MONOSABS 0.29 12/05/2020    LYMPHSABS 0.52 12/05/2020    EOSABS 0.01 12/05/2020    BASOSABS 0.00 12/05/2020     BMP:    Lab Results   Component Value Date     12/05/2020    K 4.4 12/05/2020    K 4.5 11/28/2020     12/05/2020    CO2 29 12/05/2020    BUN 31 12/05/2020    LABALBU 2.1 12/05/2020    CREATININE 1.0 12/05/2020    CALCIUM 8.5 12/05/2020    GFRAA >60 12/05/2020    LABGLOM 52 12/05/2020    GLUCOSE 130 12/05/2020       IMPRESSION:   Acute hypoxic respiratory failure. Remains with high O2 demand. Continue O2 15L + NRB, wean as ableO2 for pox > 90%. COVID 19 pneumonia  Very slow improvement.  Completed 7 days of ceftriaxone. Completed remdesivir.   Continue dexamethasone, day 5  D-Dimer 875 12/2, will repeat with CRP, Sed rate and LD  Continue Lovenox  Encouraged IS    WILLI Valenzuela  12/5/2020  12:06 PM

## 2020-12-05 NOTE — PROGRESS NOTES
[facility] Progress Note    Admitting Date and Time: 11/27/2020  1:46 PM  Admit Dx: QSAFU-13 [U07.1]  COVID-19 [U07.1]    Subjective:  Patient is being followed for COVID-19 [U07.1]  COVID-19 [U07.1]   Pt still some labored breathing, voicing no other complaints of nausea vomiting or diarrhea  Per RN: Reported fall to me today. Chart reviewed in regards to family issues of her nutrition    ROS: denies fever, chills, cp, +sob, n/v, HA unless stated above.  dexamethasone  6 mg Oral Daily    enoxaparin  1 mg/kg Subcutaneous Q12H    ferrous sulfate  325 mg Oral BID    gabapentin  100 mg Oral TID    pantoprazole  40 mg Oral BID AC    atorvastatin  10 mg Oral Daily    traZODone  50 mg Oral Nightly    sodium chloride flush  10 mL Intravenous 2 times per day    losartan  50 mg Oral Daily    hydroCHLOROthiazide  12.5 mg Oral Daily     sodium chloride flush, 10 mL, PRN  benzonatate, 100 mg, TID PRN  sodium chloride flush, 10 mL, PRN  acetaminophen, 650 mg, Q6H PRN    Or  acetaminophen, 650 mg, Q6H PRN  polyethylene glycol, 17 g, Daily PRN  promethazine, 12.5 mg, Q6H PRN    Or  ondansetron, 4 mg, Q6H PRN  guaiFENesin-dextromethorphan, 5 mL, Q4H PRN  sodium chloride, 30 mL, PRN         Objective:    BP (!) 115/58   Pulse 84   Temp 97.7 °F (36.5 °C) (Oral)   Resp 24   Ht 5' 2\" (1.575 m)   Wt 124 lb (56.2 kg)   SpO2 97%   BMI 22.68 kg/m²   Telephone exam and interview  General Appearance: alert and oriented to person, place and time and in no acute distress  Skin: warm and dry  Head: normocephalic and atraumatic  Eyes: pupils equal, round, and reactive to light, extraocular eye movements intact, conjunctivae normal  Neck: neck supple and non tender without mass   Pulmonary/Chest: Some fibrous rales. Shallow respirations at times. Labored. 15 L oxygen to a nonrebreather mask.   Dry intermittent cough  Cardiovascular: normal rate, normal S1 and S2 and no carotid bruits  Abdomen: soft, non-tender, non-distended, normal bowel sounds, no masses or organomegaly  -Pure wick  Extremities: no cyanosis, no clubbing and no edema  Neurologic: no cranial nerve deficit and speech normal  Hep-Lock      Recent Labs     12/03/20 0445 12/04/20 0305 12/05/20  0403    137 135   K 4.2 3.8 4.4    100 100   CO2 26 28 29   BUN 27* 28* 31*   CREATININE 1.0 0.9 1.0   GLUCOSE 148* 138* 130*   CALCIUM 8.6 8.5* 8.5*       Recent Labs     12/03/20 0445 12/04/20 0305 12/05/20  0403   WBC 8.1 8.0 8.4   RBC 4.75 4.65 4.32   HGB 11.9 11.5 11.0*   HCT 37.1 36.1 33.4*   MCV 78.1* 77.6* 77.3*   MCH 25.1* 24.7* 25.5*   MCHC 32.1 31.9* 32.9   RDW 14.6 14.6 14.6    286 280   MPV 11.2 10.3 10.8     11/25/2020  5:07 PM - Yunior, Mhy Incoming Results From [facility]     Specimen Information:  Nasopharyngeal Swab          Component  Value  Ref Range & Units  Status  Collected  Lab    SARS-CoV-2  DetectedAbnormal    Not Detected  Final  11/24/2020 10:01 AM  352        Labs 12/2/2020  D-dimer 875  CRP 10.7    11/28/2020   ferritin 289  Procalcitonin -0.08  Fibrinogen 700        Radiology:   CTA chest 11/27/2020  Impression:          1. There is no evidence of a pulmonary embolus. 2. Multifocal bilateral ground-glass pulmonary infiltrates most consistent   with COVID-19 pneumonia. Assessment:    Active Problems:    COVID-19    Acute respiratory failure with hypoxia (HCC)    Pneumonia due to COVID-19 virus    Community acquired pneumonia of right upper lobe of lung    Stage 3a chronic kidney disease  Resolved Problems:    * No resolved hospital problems. *      Plan:  1. Acute hypoxic failure respiratory to COVID-19, + 11/25  -Titrating oxygen.   Recent 15 L nonrebreather----it looks like 15 L high flow +15 L O2  -Continue with Decadron day 9 out of 10  -Remdesivir x5 days-pharmacy on--done 12/2/20  -Pulmonary consult, notes appreciated  -Treatments        COVID-19 viral pneumonia with possible superimposed bacterial pneumonia  -Chest x-ray noted  -Ceftriaxone 7 days complete 12/3/2020  -Continue supportive care with oxygen  -Tessalon for cough  -Follow urine and strep Legionella      Essential hypertension  -Continue home medications, parameters  -Cozaar, hydrochlorothiazide    Hyperlipidemia  -Continue Lipitor      Fall-mechanical fall was in the bathroom and toilet area  -No noted head injury check bilateral hip x-rays-NEG for frx              CODE STATUS full  DVT prophylaxis Lovenox  Diet General diet  PPI  Disposition [facility] to go home, case management note appreciated that oxygen demand is still too high at this point and will follow along  [facility]        NOTE: This report was transcribed using voice recognition software. Every effort was made to ensure accuracy; however, inadvertent computerized transcription errors may be present.   Electronically signed by CARMEN Bee on 12/5/2020 at 8:08 AM

## 2020-12-06 NOTE — PROGRESS NOTES
Spoke to Elroy on 12/5/2020 evening  Spoke to Keshia Sams on the phone today for a brief patient status update. Labs were reviewed decreasing oxygen from 30 to 24 L.   All questions answered keep him up-to-date on any significant changes

## 2020-12-06 NOTE — PROGRESS NOTES
normal  Neck: neck supple and non tender without mass   Pulmonary/Chest: Some fibrous rales. Shallow respirations at times. Labored. 30L L oxygen to a nonrebreather mask. Dry intermittent cough, clears cough. Cardiovascular: normal rate, normal S1 and S2 and no carotid bruits  Abdomen: soft, non-tender, non-distended, normal bowel sounds, no masses or organomegaly  -Pure wick  Extremities: no cyanosis, no clubbing and no edema  Neurologic: no cranial nerve deficit and speech normal  Hep-Lock      Recent Labs     12/04/20  0305 12/05/20  0403 12/06/20  0310    135 138   K 3.8 4.4 4.6    100 103   CO2 28 29 31*   BUN 28* 31* 34*   CREATININE 0.9 1.0 1.0   GLUCOSE 138* 130* 136*   CALCIUM 8.5* 8.5* 8.5*       Recent Labs     12/04/20  0305 12/05/20  0403   WBC 8.0 8.4   RBC 4.65 4.32   HGB 11.5 11.0*   HCT 36.1 33.4*   MCV 77.6* 77.3*   MCH 24.7* 25.5*   MCHC 31.9* 32.9   RDW 14.6 14.6    280   MPV 10.3 10.8     11/25/2020  5:07 PM - Yunior, y Incoming Results From [facility]     Specimen Information:  Nasopharyngeal Swab          Component  Value  Ref Range & Units  Status  Collected  Lab    SARS-CoV-2  DetectedAbnormal    Not Detected  Final  11/24/2020 10:01 AM  352        Labs 12/2/2020  D-dimer 875  CRP 10.7    11/28/2020   ferritin 289  Procalcitonin -0.08  Fibrinogen 700        Radiology:   CTA chest 11/27/2020  Impression:          1. There is no evidence of a pulmonary embolus. 2. Multifocal bilateral ground-glass pulmonary infiltrates most consistent   with COVID-19 pneumonia. Assessment:    Active Problems:    COVID-19    Acute respiratory failure with hypoxia (HCC)    Pneumonia due to COVID-19 virus    Community acquired pneumonia of right upper lobe of lung    Stage 3a chronic kidney disease  Resolved Problems:    * No resolved hospital problems. *      Plan:  1. Acute hypoxic failure respiratory to COVID-19, + 11/25  -Titrating oxygen.   Recent 15 L nonrebreather----it looks like 15 L high flow +15 L O2  -Continue with Decadron day 9 out of 10  -Remdesivir x5 days-pharmacy on--done 12/2/20  -Pulmonary consult, notes appreciated  -Treatments        COVID-19 viral pneumonia with possible superimposed bacterial pneumonia  -Chest x-ray noted  -Ceftriaxone 7 days complete 12/3/2020  -Continue supportive care with oxygen  -Tessalon for cough  -Follow urine and strep Legionella      Essential hypertension  -Continue home medications, parameters  -Cozaar, hydrochlorothiazide    Hyperlipidemia  -Continue Lipitor      Fall-mechanical fall was in the bathroom and toilet area  -No noted head injury check bilateral hip x-rays-NEG for frx        Repeat a.m. labs, Covid labs  Miscellaneous order in to try to cut down on the liters of oxygen to maintain sat. IS    CODE STATUS full  DVT prophylaxis Lovenox  Diet General diet  PPI  Disposition [facility] to go home, case management note appreciated that oxygen demand is still too high at this point and will follow along  Yudy Cote        NOTE: This report was transcribed using voice recognition software. Every effort was made to ensure accuracy; however, inadvertent computerized transcription errors may be present.   Electronically signed by CARMEN Thompson on 12/6/2020 at 8:26 AM

## 2020-12-07 NOTE — PROGRESS NOTES
membranes moist   Neck: Supple, Trachea midline. Resp: No accessory muscle use. mildly-labored. Lungs diminished  with No crackles. No wheezing. No rhonchi. CV: Regular rate. Regular rhythm. No murmur . No edema. ABD: Non-tender. Non-distended. Soft, round . Normal bowel sounds. Skin: Warm and dry. M/S: No cyanosis. No joint deformity. No clubbing. Neuro: Awake. Follows commands. O x 3, HOLLINS  Psych:  calm and interactive     I/O: I/O last 3 completed shifts: In: 360 [P.O.:360]  Out: 200 [Urine:200]  No intake/output data recorded. Results:  CBC:   Recent Labs     12/05/20  0403 12/07/20  0330   WBC 8.4 11.6*   HGB 11.0* 10.5*   HCT 33.4* 33.3*   MCV 77.3* 78.9*    248     BMP:   Recent Labs     12/05/20  0403 12/06/20  0310 12/07/20  0330    138 137   K 4.4 4.6 4.2    103 103   CO2 29 31* 29   BUN 31* 34* 27*   CREATININE 1.0 1.0 0.9     LFT:   Recent Labs     12/05/20  0403 12/06/20  0310 12/07/20  0330   ALKPHOS 73 102 74   ALT 18 22 26   AST 18 24 26   PROT 5.2* 5.2* 5.2*   BILITOT 0.4 0.4 0.4   BILIDIR  --   --  <0.2   LABALBU 2.1* 2.1* 2.0*     PT/INR:   Recent Labs     12/07/20  0330   PROTIME 12.8*   INR 1.1     Procalcitonin: Results for Mckenzie Nelson (MRN [id number]) as of 12/7/2020 10:12   Ref. Range 11/28/2020 06:30   Procalcitonin Latest Ref Range: 0.00 - 0.08 ng/mL 0.08     Cultures:  Results for Mckenzie Nelson (MRN [id number]) as of 12/7/2020 10:12   Ref. Range 11/24/2020 10:01   SARS-CoV-2 Latest Ref Range: Not Detected  Detected (A)         ABG:   Results for Mckenzie Nelson (MRN [id number]) as of 12/7/2020 10:12   Ref.  Range 11/27/2020 14:11   Source: Unknown Blood Arterial   pH, Blood Gas Latest Ref Range: 7.350 - 7.450  7.435   PCO2 Latest Ref Range: 35.0 - 45.0 mmHg 25.6 (L)   pO2 Latest Ref Range: 75.0 - 100.0 mmHg 84.1   HCO3 Latest Ref Range: 22.0 - 26.0 mmol/L 16.8 (L)   Base Excess Latest Ref Range: -3.0 - 3.0 mmol/L -6.0 (L)   O2 Sat Latest Ref Range: embolism. Main pulmonary artery is normal in caliber. Mediastinum: No evidence of mediastinal lymphadenopathy. The heart is mildly enlarged. There is no pericardial effusion. Lungs/pleura: Multifocal bilateral ground-glass pulmonary infiltrates are noted most consistent with COVID-19 pneumonia. There is no pleural effusion. Upper Abdomen: Limited images of the upper abdomen are unremarkable. Soft Tissues/Bones: No acute bone or soft tissue abnormality. 1. There is no evidence of a pulmonary embolus. 2. Multifocal bilateral ground-glass pulmonary infiltrates most consistent with COVID-19 pneumonia. Assessment:  59-year-old female with history of iron deficiency anemia, HTN, HLD, hiatal hernia/GERD with progressing shortness of breath she has seen her PCP 11/24 And was tested for coronavirus. 11/27 admitted to the hospital requiring 15 L to 10 L  11/27 x-ray showed worsening airspace disease  CT scan showed no PE multifocal groundglass opacities  11/30 desaturating to 78% patient placed on 15 L  12/1 on 15 L and high flow O2  12/4 on nonrebreather and 15 L. Hip film negative. Chest x-ray showing infiltrates are more dense        1. Acute hypoxic respiratory failure. 2. COVID 19 pneumonitis s/p remdesivir,  3. Possible community-acquired pneumonia completed 7 days of ceftriaxone. 4. CKD  5. Hypercoagulable state due to coronavirus on high-dose Lovenox twice daily  6. History of chronic cough  7.  History of GERD arthritis HTN    Plan:  · Condition is guarded!!  · Patient ws on 15LHF and 8L NRB pox 60%, now on opti-flow 60L and 100% and 15LNRB, POX 88-90%, mildly labored breathing and increased dry cough this am, cxr last 11/27 with increased infiltrates will repeat now, if any further deterioration will need transfer to ICU  · Get ABG now   · Get pro BNP now   · On Decadron 6mg oral day # 6, increase to 6mg IV, BID  · On lovenox, d-dimer 2289--->2679--->875--->524--->647  · CRP 6.10--->110.7--->9.1  · Add vitamin C, zinc and thiamine     Discussed case with Dr. Hemalatha Ferris, POX only 88% on max O2 mildly labored breathing and increase cough feels SOB, transfer to ICU         Electronically signed by CARMEN Molina on 12/7/2020 at 10:10 AM

## 2020-12-07 NOTE — H&P
Critical Care Admit/Consult Note         Patient Mandy Gonzales   MRN -  [id number]   Sabrina # - [de-identified]   - 1933      Date of Admission -  2020  1:46 PM  Date of evaluation -  2020  [id number]   Hospital Day - 10            ADMIT/CONSULT DETAILS     Reason for Admit/Consult   Critical care management    Consulting Lizzy Morales MD  Primary Care Physician - Kelly Bryan, DO         HPI   The patient is a 80 y.o. female with significant past medical history of iron deficiency anemia due to chronic blood loss, hypertension, hyperlipidemia, chronic kidney disease. She was admitted to the hospital for 4 days of shortness of breath on  to underlying COVID-19 pneumonia. She said the shortness of breath is worsening with exertion, and improved with nothing, she had no associated cough with some mucus, and some muscle pains, denies any fevers, chills, chest pain, nausea, vomiting, diarrhea. She is progressively getting more short of breath, and has had worsening hypoxia through her hospital stay, she has been receiving Decadron, and finished her course of remdesivir already. Today she was brought in the ICU because she is hypoxic despite being on OptiFlow. NICU was found that she was running in the upper 80s/low 90s on OptiFlow, and nonrebreather. Repeat chest x-ray did not show any worsening of the pulmonary infiltrates compared to previous. She did however develop a fever of 102 degrees, suspect underlying superimposed bacterial pneumonia. She also had an elevated D-dimer, she is already on full dose Lovenox. ABG done in the ICU showed a sat of 88.5%.          Past Medical History         Diagnosis Date    Arthritis     GERD (gastroesophageal reflux disease)     Hernia, hiatal     Hyperlipidemia     Hypertension         Past Surgical History           Procedure Laterality Date    UPPER GASTROINTESTINAL ENDOSCOPY      UPPER GASTROINTESTINAL ENDOSCOPY  2016    1 biopsy       SOCIAL AND OCCUPATIONAL HEALTH:  The patient is a Never smoker. Influenza:  Not Up-to-date  Pneumococcal Polysaccharide:  Up-to-date; approximate date: 7/10/2017                Current Medications   Current Medications    dexamethasone  6 mg Intravenous Q12H    zinc sulfate  50 mg Oral Daily    vitamin C  500 mg Oral Daily    vitamin B-1  100 mg Oral Daily    enoxaparin  1 mg/kg Subcutaneous Q12H    ferrous sulfate  325 mg Oral BID    gabapentin  100 mg Oral TID    pantoprazole  40 mg Oral BID AC    atorvastatin  10 mg Oral Daily    traZODone  50 mg Oral Nightly    sodium chloride flush  10 mL Intravenous 2 times per day    losartan  50 mg Oral Daily    hydroCHLOROthiazide  12.5 mg Oral Daily     sodium chloride flush, benzonatate, sodium chloride flush, acetaminophen **OR** acetaminophen, polyethylene glycol, promethazine **OR** ondansetron, guaiFENesin-dextromethorphan, sodium chloride  IV Drips/Infusions    Home Medications  Medications Prior to Admission: azithromycin (ZITHROMAX Z-NIR) 250 MG tablet, Use as directed  [] benzonatate (TESSALON) 100 MG capsule, Take 1 capsule by mouth 3 times daily as needed for Cough  [] cefdinir (OMNICEF) 300 MG capsule, Take 1 capsule by mouth 2 times daily for 10 days  brompheniramine-pseudoephedrine-DM 2-30-10 MG/5ML syrup, Take 5 mLs by mouth 4 times daily as needed for Congestion or Cough  azithromycin (ZITHROMAX Z-NIR) 250 MG tablet, Use as directed  traZODone (DESYREL) 50 MG tablet, Take 1 tablet by mouth nightly  amLODIPine (NORVASC) 5 MG tablet, take 1 tablet by mouth once daily  mometasone (ELOCON) 0.1 % cream, Apply topically daily.   pantoprazole (PROTONIX) 40 MG tablet, Take 1 tablet by mouth 2 times daily (before meals)  ferrous sulfate 325 (65 Fe) MG tablet, TAKE 1 TABLET BY MOUTH TWICE DAILY  fluticasone (FLOVENT HFA) 220 MCG/ACT inhaler, 2puffs twice per day  hydrochlorothiazide (Comprehensive)  Vent Information  Skin Assessment: Clean, dry, & intact  FiO2 : 100 %  SpO2: (!) 89 %  SpO2/FiO2 ratio: 89  Humidification Source: Heated wire  Humidification Temp: 37  Humidification Temp Measured: 37  Additional Respiratory  Assessments  Pulse: 78  Resp: (!) 35  SpO2: (!) 89 %  Humidification Source: Heated wire  Humidification Temp: 37    ABG  Lab Results   Component Value Date    PH 7.481 2020    PCO2 38.1 2020    PO2 53.9 2020    HCO3 27.8 2020    O2SAT 88.5 2020     Lab Results   Component Value Date    MODE HHFNC 60L +NRB 2020           Vitals    height is 5' 2\" (1.575 m) and weight is 111 lb 15.9 oz (50.8 kg). Her bladder temperature is 100.9 °F (38.3 °C). Her blood pressure is 110/73 and her pulse is 78. Her respiration is 35 (abnormal) and oxygen saturation is 89% (abnormal). Temperature Range: Temp: 100.9 °F (38.3 °C) Temp  Av.8 °F (37.1 °C)  Min: 97 °F (36.1 °C)  Max: 100.9 °F (38.3 °C)  BP Range:  Systolic (16XGD), PZM:186 , Min:96 , RPH:726     Diastolic (22VFO), ZQI:90, Min:50, Max:73    Pulse Range: Pulse  Av.3  Min: 64  Max: 92  Respiration Range: Resp  Av.2  Min: 22  Max: 35  Current Pulse Ox[de-identified]  SpO2: (!) 89 %  24HR Pulse Ox Range:  SpO2  Av.4 %  Min: 88 %  Max: 95 %  Oxygen Amount and Delivery: O2 Flow Rate (L/min): 23 L/min(15L high flow N/C, 8L NRB mask)      I/O (24 Hours)    Patient Vitals for the past 8 hrs:   BP Temp Temp src Pulse Resp SpO2 Weight   20 1110 110/73 100.9 °F (38.3 °C) Bladder 78 (!) 35 (!) 89 % 111 lb 15.9 oz (50.8 kg)   2054 -- -- -- -- 26 (!) 88 % --   2015 120/62 -- -- 92 26 -- --       Intake/Output Summary (Last 24 hours) at 2020 1207  Last data filed at 2020 0348  Gross per 24 hour   Intake 360 ml   Output 200 ml   Net 160 ml     I/O last 3 completed shifts:   In: 360 [P.O.:360]  Out: 200 [Urine:200]   Date 20 0000 - 20 2359   Shift 7826-4243 [id number] 5296-5652 24 Hour Total   INTAKE   Shift Total(mL/kg)       OUTPUT   Urine(mL/kg/hr) 200(0.4)   200   Shift Total(mL/kg) 200(3.6)   200(3.9)   Weight (kg) 56.2 50.8 50.8 50.8     Patient Vitals for the past 96 hrs (Last 3 readings):   Weight   12/07/20 1110 111 lb 15.9 oz (50.8 kg)         Drains/Tubes Outputs  Phan 12/7/20  Exam         PHYSICAL EXAM:    General appearance - alert, oriented, appears dyspneic  Mental status - alert, oriented to person, place, and time, normal mood, behavior, speech, dress, motor activity, and thought processes  Eyes - pupils equal and reactive, extraocular eye movements intact, sclera anicteric  Ears - external ear normal  Nose - normal and patent, no erythema, discharge or polyps  Mouth - mucous membranes moist, pharynx normal without lesions  Neck - supple, no significant adenopathy  Chest -rhonchorous, crackles in the lungs, diminished throughout  Heart - normal rate, regular rhythm, normal S1, S2, no murmurs, rubs, clicks or gallops  Abdomen - soft, nontender, nondistended, no masses or organomegaly  Neurological - alert, oriented, normal speech, no focal findings or movement disorder noted  Extremities - peripheral pulses normal, no clubbing or cyanosis. No edema.   Skin - normal coloration and turgor, no rashes, no suspicious skin lesions noted, diaphoretic feels very warm    Data   Old records and images have been reviewed    Lab Results   CBC     Lab Results   Component Value Date    WBC 11.6 12/07/2020    RBC 4.22 12/07/2020    HGB 10.5 12/07/2020    HCT 33.3 12/07/2020     12/07/2020    MCV 78.9 12/07/2020    MCH 24.9 12/07/2020    MCHC 31.5 12/07/2020    RDW 14.4 12/07/2020    NRBC 0.0 11/28/2020    LYMPHOPCT 3.9 12/07/2020    MONOPCT 3.0 12/07/2020    BASOPCT 0.1 12/07/2020    MONOSABS 0.35 12/07/2020    LYMPHSABS 0.45 12/07/2020    EOSABS 0.01 12/07/2020    BASOSABS 0.01 12/07/2020       BMP   Lab Results   Component Value Date     12/07/2020    K 4.2 12/07/2020    K 4.5 11/28/2020     12/07/2020    CO2 29 12/07/2020    BUN 27 12/07/2020    CREATININE 0.9 12/07/2020    GLUCOSE 146 12/07/2020    CALCIUM 8.5 12/07/2020       LFTS  Lab Results   Component Value Date    ALKPHOS 74 12/07/2020    ALT 26 12/07/2020    AST 26 12/07/2020    PROT 5.2 12/07/2020    BILITOT 0.4 12/07/2020    BILIDIR <0.2 12/07/2020    IBILI see below 12/07/2020    LABALBU 2.0 12/07/2020       INR  Recent Labs     12/07/20  0330   PROTIME 12.8*   INR 1.1       APTT  Recent Labs     12/07/20  0330   APTT 30.3       Lactic Acid  Lab Results   Component Value Date    LACTA 2.1 01/17/2018        BNP   No results for input(s): BNP in the last 72 hours. Cultures     No results for input(s): BC in the last 72 hours. No results for input(s): [place] in the last 72 hours. No results for input(s): LABURIN in the last 72 hours. Radiology   Xr Chest (2 Vw)    Result Date: 11/24/2020  EXAMINATION: TWO XRAY VIEWS OF THE CHEST 11/24/2020 2:21 pm COMPARISON: January 18, 2018 HISTORY: ORDERING SYSTEM PROVIDED HISTORY: Cough FINDINGS: There is some patchy opacity in the left mid lung and at the right base which likely represent developing pneumonic infiltrates. Stable cardiomediastinal silhouette. Neither costophrenic angle appears blunted. Developing patchy pneumonic infiltrates bilaterally. Xr Hip Bilateral W Ap Pelvis (2 Views)    Result Date: 12/4/2020  EXAMINATION: ONE XRAY VIEW OF THE PELVIS AND TWO XRAY VIEWS OF EACH OF THE BILATERAL HIPS 12/4/2020 1:28 pm COMPARISON: None. HISTORY: ORDERING SYSTEM PROVIDED HISTORY: Fall, fracture TECHNOLOGIST PROVIDED HISTORY: Bedside if possible Reason for exam:->Fall, fracture FINDINGS: There is no evidence of acute fracture or dislocation involving the left hip or right hip. Mild degenerative narrowing involves the hips bilaterally. No aggressive osseous lesion. The visualized bowel gas pattern is nonobstructive.   There are least mild degenerative changes of the lower lumbar spine. No acute osseous abnormality. Mild degenerative changes of the hips bilaterally. Xr Chest Portable    Result Date: 12/4/2020  EXAMINATION: ONE XRAY VIEW OF THE CHEST 12/4/2020 9:17 am COMPARISON: Previous CT scan of 11/27/2020. HISTORY: ORDERING SYSTEM PROVIDED HISTORY: covid pneumonia TECHNOLOGIST PROVIDED HISTORY: Reason for exam:->covid pneumonia FINDINGS: Multifocal bilateral pulmonary infiltrates are noted. The infiltrates are more widespread and dense when compared with the patient's prior study. There is no pleural effusion. 1. Multifocal bilateral pulmonary infiltrates which are more dense and more widespread when compared with the patient's prior CT scan of 11/27/2020. Xr Chest Portable    Result Date: 11/27/2020  EXAMINATION: ONE XRAY VIEW OF THE CHEST 11/27/2020 1:15 pm COMPARISON: 24 November 2020 HISTORY: ORDERING SYSTEM PROVIDED HISTORY: shortness of breath, known covid TECHNOLOGIST PROVIDED HISTORY: Reason for exam:->shortness of breath, known covid FINDINGS: There is increasing bilateral airspace disease which is geographically much more extensive than on the prior study. Suspect that this represents progressive pneumonia. Cardiomediastinal silhouette is stable. Neither costophrenic angle is visibly blunted. Notably worsening bilateral airspace disease which may relate to progressive pneumonia. Cta Pulmonary W Contrast    Result Date: 11/27/2020  EXAMINATION: CTA OF THE CHEST 11/27/2020 3:27 pm TECHNIQUE: CTA of the chest was performed after the administration of intravenous contrast.  Multiplanar reformatted images are provided for review. MIP images are provided for review. Dose modulation, iterative reconstruction, and/or weight based adjustment of the mA/kV was utilized to reduce the radiation dose to as low as reasonably achievable. COMPARISON: None.  HISTORY: ORDERING SYSTEM PROVIDED HISTORY: Eval for PE, known COVID TECHNOLOGIST PROVIDED HISTORY: Reason for exam:->Eval for PE, known COVID FINDINGS: Pulmonary Arteries: Pulmonary arteries are adequately opacified for evaluation. No evidence of intraluminal filling defect to suggest pulmonary embolism. Main pulmonary artery is normal in caliber. Mediastinum: No evidence of mediastinal lymphadenopathy. The heart is mildly enlarged. There is no pericardial effusion. Lungs/pleura: Multifocal bilateral ground-glass pulmonary infiltrates are noted most consistent with COVID-19 pneumonia. There is no pleural effusion. Upper Abdomen: Limited images of the upper abdomen are unremarkable. Soft Tissues/Bones: No acute bone or soft tissue abnormality. 1. There is no evidence of a pulmonary embolus. 2. Multifocal bilateral ground-glass pulmonary infiltrates most consistent with COVID-19 pneumonia. SYSTEMS ASSESSMENT    Neuro   Continue home gabapentin, trazodone    Respiratory   Hypoxic respiratory failure-continue on OptiFlow, may require noninvasive ventilation or possibly even intubation if she continues to deteriorate  Wean oxygen as tolerated. Keep O2 sat 90-92%    Cardiovascular   Continue home losartan, HCTZ, Lipitor    Gastrointestinal   GI prophylaxis with Protonix    Renal   Creatinine appears stable at 0.9-1.0, continue to monitor     Infectious Disease   COVID-19 pneumonia with possible superimposed bacterial pneumonia-ordered Legionella and strep antigens, blood cultures, urine cultures-give antibiotics-vanc, cefepime-, continue Decadron, thiamine, vitamin C, and zinc    Hematology/Oncology   Full dose Lovenox for DVT prophylaxis  Continue home iron due to patient's underlying chronic anemia    Endocrine   Clear liquid diet    Social/Spiritual/DNR/Other   Full code    ASSESSMENT/ PLAN   1. Continue full dose Lovenox, home meds  2.  Hypoxic respiratory failure-continue OptiFlow, nonrebreather, may require BiPAP  3. COVID-19 with superimposed bacterial pneumonia-continue Decadron, zinc, vitamin C, thiamine, ordered blood cultures, urinalysis, start empiric antibiotics-vanc, cefepime    Magui Sheth MD PGY-1  12/7/2020 12:53 PM      Critical Care Attending Addendum:    Patient seen and examined with the house staff. X-rays personally reviewed through the PACS. Family is updated at the bedside as available. Additional findings listed below as necessary. Additional comments:  1. Worsening acute hypoxic respiratory failure  2. COVID 19 pneumonia  3. Oxygenation is worse despite improving radiograph but fully anticoagulated and quite febrile so favor pneumonia (bacterial) will add vanco and cefepime pending cultures. 4. I spoke to patient and family friend about code status but nothing was decided.     35 min CCT

## 2020-12-07 NOTE — PLAN OF CARE
Problem: Airway Clearance - Ineffective  Goal: Achieve or maintain patent airway  Outcome: Met This Shift
Predicted inadequate energy intake  Intervention: Food and/or Nutrient Delivery: Continue Current Diet, Start Oral Nutrition Supplement  Nutritional Goals: PO % of meals and supplement. No significant wt loss.      Outcome: Met This Shift

## 2020-12-07 NOTE — CARE COORDINATION
Patient transferred to ICU. Call placed to patient daughter Rubi Green to ensure same had been communicated to her, which she confirms it had been. Past notes from CM/SW reviewed. When appropriate, same dc plan can be executed. Rubi Green had no additional questions at this time. [place].  Jose, MSN, RN  [facility] Case Management  [phone]

## 2020-12-07 NOTE — PROGRESS NOTES
[facility] Hospitalist   Progress Note    Admitting Date and Time: 11/27/2020  1:46 PM  Admit Dx: [id number] [U07.1]  COVID-19 [U07.1]     Seen for follow up , now in ICU  , required increasing O2 need , now on optiflow    Subjective:  D/w bedside nurse , has new fever 102, started on IV cefepime and vancomycin. Requiring high flow O2 , currently on optiflow FIO2 100 %       dexamethasone  6 mg Intravenous Q12H    zinc sulfate  50 mg Oral Daily    vitamin C  500 mg Oral Daily    vitamin B-1  100 mg Oral Daily    atorvastatin  10 mg Oral Nightly    vancomycin  750 mg Intravenous Q24H    cefepime  2 g Intravenous Q12H    enoxaparin  1 mg/kg Subcutaneous Q12H    ferrous sulfate  325 mg Oral BID    gabapentin  100 mg Oral TID    pantoprazole  40 mg Oral BID AC    traZODone  50 mg Oral Nightly    sodium chloride flush  10 mL Intravenous 2 times per day    losartan  50 mg Oral Daily    hydroCHLOROthiazide  12.5 mg Oral Daily     sodium chloride flush, 10 mL, PRN  benzonatate, 100 mg, TID PRN  acetaminophen, 650 mg, Q6H PRN    Or  acetaminophen, 650 mg, Q6H PRN  polyethylene glycol, 17 g, Daily PRN  promethazine, 12.5 mg, Q6H PRN    Or  ondansetron, 4 mg, Q6H PRN  guaiFENesin-dextromethorphan, 5 mL, Q4H PRN  sodium chloride, 30 mL, PRN         Objective:    BP (!) 91/54   Pulse 64   Temp 100.8 °F (38.2 °C) (Bladder)   Resp 25   Ht 5' 2\" (1.575 m)   Wt 111 lb 15.9 oz (50.8 kg)   SpO2 94%   BMI 20.48 kg/m²    Due to patient's COVID-19 positive status, to reduce exposure, contamination and in  an effort to conserve PPE this patient was evaluated through a combination of review of the medical record,, nursing assessment and other physicians exams and assessments as well as telemedicine interaction.       General Appearance: alert and oriented to person, place and time, no overt distress noted , comfortable with above  Head: normocephalic and atraumatic  Eyes:  extraocular eye movements intact, conjunctivae normal  Neck: supple and non-tender without mass  Pulmonary/Chest: diffuse bi basilar dry rales  Cardiovascular: normal rate, regular rhythm, normal S1 and S2  Abdomen: soft, non-tender, non-distended, normal bowel sounds, no masses or organomegaly  Musculoskeletal: normal range of motion  Neurologic:no cranial nerve deficit, speech normal      Recent Labs     12/05/20  0403 12/06/20  0310 12/07/20  0330    138 137   K 4.4 4.6 4.2    103 103   CO2 29 31* 29   BUN 31* 34* 27*   CREATININE 1.0 1.0 0.9   GLUCOSE 130* 136* 146*   CALCIUM 8.5* 8.5* 8.5*       Recent Labs     12/05/20  0403 12/07/20  0330   WBC 8.4 11.6*   RBC 4.32 4.22   HGB 11.0* 10.5*   HCT 33.4* 33.3*   MCV 77.3* 78.9*   MCH 25.5* 24.9*   MCHC 32.9 31.5*   RDW 14.6 14.4    248   MPV 10.8 10.3       Labs and images reviewed     Radiology:   XR CHEST PORTABLE   Final Result   No significant change since the December 4. XR HIP BILATERAL W AP PELVIS (2 VIEWS)   Final Result   No acute osseous abnormality. Mild degenerative changes of the hips bilaterally. XR CHEST PORTABLE   Final Result   1. Multifocal bilateral pulmonary infiltrates which are more dense and more   widespread when compared with the patient's prior CT scan of 11/27/2020. CTA PULMONARY W CONTRAST   Final Result   1. There is no evidence of a pulmonary embolus. 2. Multifocal bilateral ground-glass pulmonary infiltrates most consistent   with COVID-19 pneumonia. XR CHEST PORTABLE   Final Result   Notably worsening bilateral airspace disease which may relate to progressive   pneumonia. Assessment:    Active Problems:    COVID-19    Acute respiratory failure with hypoxia (HCC)    Pneumonia due to COVID-19 virus    Community acquired pneumonia of right upper lobe of lung    Stage 3a chronic kidney disease  Resolved Problems:    * No resolved hospital problems.  *      Plan:  Acute hypoxic respiratory failure secondary to Covid 19 pneumonia and superimposed bacterial pneumonia - currently on 15 L nonrebreather-continue to titrate as possible. Has completed remdesivir. On Decadron. Completed ceftriaxone course. Has elevated D-dimer and is on subcu Lovenox as per Covid protocol. D/w nursing continue to titrate O2 as possible , will add ICS. Her O2 requirement still continues to remain high, noted as per pulmonary now with an ICU and on  OptiFlow. Discussed with bedside nurse. Pulmonary following. New fever - blood cultures sent , and as above on Iv cefepime + VM , continue to monitor.     Hypertension-continue home medications with parameters to hold     Hyperlipidemia-on statin    Status post mechanical fall-no injury, bilateral hip x-ray negative for fracture    On Lovenox for DVT prophylaxis  Full code      Electronically signed by Jaylon Cali MD on 12/7/2020 at 5:32 PM

## 2020-12-08 NOTE — CARE COORDINATION
Pt covid + transferred to ICU yesterday d/t increasing 02 demands, worsening hypoxic resp failure. Pt wishing no further aggressive treatment; requesting Hospice; daughter Nakia Meseret in agreement. Referral made; pt to transport home this evening with Hospice. now a [facility]. Kassandra Jackson.

## 2020-12-08 NOTE — FLOWSHEET NOTE
Patient left with ambulance on 10 liters n/c. Patient medicated with fentanyl. Patient appeared comfortable. All belongings with patient.

## 2020-12-08 NOTE — PATIENT CARE CONFERENCE
Intensive Care Daily Quality Rounding Checklist      ICU Team Members: Bedside Nurse, Nursing Unit Leadership, Respiratory Therapy, Pharmacist and     ICU Day #: NUMBER: 2    Intubation Date:   0    Ventilator Day #: NUMBER: 0    Central Line Insertion Date: 0 0        Day #: NUMBER: 0       Arterial Line Insertion Date: 0 0      Day #: NUMBER: 0    DVT Prophylaxis:lovenox    GI Prophylaxis:protonix    Phan Catheter Insertion Date: December 7       Day #: 2      Continued need (if yes, reason documented and discussed with physician): yes, high 02 requirements    Skin Issues/ Wounds and ordered treatment discussed on rounds: y no issues    Goals/ Plans for the Day: wean 02 as tolerated, monitor labs and replace as needed, continue atbx mrsa swab pending,

## 2020-12-08 NOTE — PROGRESS NOTES
CLINICAL PHARMACY NOTE: MEDS TO [address] [place] [address] Drive Select Patient?: No  Total # of Prescriptions Filled: 5   The following medications were delivered to the patient:  · Dexamethasone 6  · Lorazepam 0.5  · glycopyrolate 1  · Benzonatate 100  · Morphine 100/5  Total # of Interventions Completed: 6  Time Spent (min): 45    Additional Documentation:

## 2020-12-08 NOTE — PROGRESS NOTES
Critical Care Team - Daily Progress Note      Date and time: 12/8/2020 8:27 AM  Patient's name:  Spenser Mcclure  Medical Record Number: [id number]  Patient's account/billing number: [de-identified]  Patient's YOB: 1933  Age: 80 y.o.   Date of Admission: 11/27/2020  1:46 PM  Length of stay during current admission: 11      Primary Care Physician: Stanton Urbina DO  ICU Attending Physician: Dr. Mike Titus    Code Status: Full Code    Reason for ICU admission: Critical care management      SUBJECTIVE:     OVERNIGHT EVENTS:       12/8-intermittently had to increase the oxygen percentage on the OptiFlow, but remained off nonrebreather throughout the night    AWAKE & FOLLOWING COMMANDS:  [] No   [x] Yes    CURRENT VENTILATION STATUS:     [] Ventilator  [] BIPAP  [x] Nasal Cannula [] Room Air      IF INTUBATED, ET TUBE MARKING AT LOWER LIP:       cms    SECRETIONS Amount:  [] Small [] Moderate  [] Large  [] None  Color:     [] White [] Colored  [] Bloody    SEDATION:  RAAS Score:  [] Propofol gtt  [] Versed gtt  [] Ativan gtt   [x] No Sedation    PARALYZED:  [x] No    [] Yes    DIARRHEA:                [x] No                [] Yes  (C. Difficile status: [] positive                                                                                                                       [] negative                                                                                                                     [] pending)    VASOPRESSORS:  [x] No    [] Yes    If yes -   [] Levophed       [] Dopamine     [] Vasopressin       [] Dobutamine  [] Phenylephrine         [] Epinephrine    CENTRAL LINES:     [x] No   [] Yes   (Date of Insertion:   )           If yes -     [] Right IJ     [] Left IJ [] Right Femoral [] Left Femoral                   [] Right Subclavian [] Left Subclavian       MORENO'S CATHETER:   [] No   [x] Yes  (Date of Insertion: 12/7/20  )     URINE OUTPUT:            [x] Good   [] Low              [] Anuric      OBJECTIVE:     VITAL SIGNS:  BP (!) 123/57   Pulse 54   Temp 98.6 °F (37 °C) (Bladder)   Resp 19   Ht 5' 2\" (1.575 m)   Wt 111 lb 15.9 oz (50.8 kg)   SpO2 94%   BMI 20.48 kg/m²   Tmax over 24 hours:  Temp (24hrs), Av.2 °F (37.9 °C), Min:98.6 °F (37 °C), Max:102 °F (38.9 °C)      Patient Vitals for the past 6 hrs:   BP Temp Temp src Pulse Resp SpO2   20 0801 -- -- -- -- 19 94 %   20 0600 (!) 123/57 -- -- 54 23 92 %   20 0500 (!) 113/53 -- -- 56 23 91 %   20 0438 -- -- -- -- 21 (!) 88 %   20 0400 (!) 96/52 98.6 °F (37 °C) Bladder 57 26 90 %   20 0300 (!) 115/55 -- -- 51 22 93 %         Intake/Output Summary (Last 24 hours) at 2020 0827  Last data filed at 2020 0600  Gross per 24 hour   Intake 470 ml   Output 510 ml   Net -40 ml     Wt Readings from Last 2 Encounters:   20 111 lb 15.9 oz (50.8 kg)   20 117 lb (53.1 kg)     Body mass index is 20.48 kg/m². PHYSICAL EXAMINATION:    General appearance - alert, oriented, dyspneic but appears more comfortable than yesterday  Mental status - alert, oriented to person, place, and time  Eyes - pupils equal and reactive, extraocular eye movements intact, sclera anicteric  Ears - external ear normal  Nose - normal and patent, no erythema, discharge or polyps, OptiFlow in place  Mouth - mucous membranes moist, pharynx normal without lesions, teeth intact  Neck - supple, no significant adenopathy  Chest -crackles throughout the lungs, diminished, rhonchorous  Heart - bradycardiac rate, regular rhythm, normal S1, S2, no murmurs, rubs, clicks or gallops  Abdomen - soft, nontender, nondistended, no masses or organomegaly  Neurological - alert, oriented, normal speech, no focal findings or movement disorder noted  Extremities - peripheral pulses normal, no clubbing or cyanosis. No edema.   Skin - normal coloration and turgor, no rashes, no suspicious skin lesions noted      Any additional physical findings:    MEDICATIONS:    Scheduled Meds:   dexamethasone  6 mg Intravenous Q12H    zinc sulfate  50 mg Oral Daily    vitamin C  500 mg Oral Daily    vitamin B-1  100 mg Oral Daily    atorvastatin  10 mg Oral Nightly    vancomycin  750 mg Intravenous Q24H    cefepime  2 g Intravenous Q12H    enoxaparin  1 mg/kg Subcutaneous Q12H    ferrous sulfate  325 mg Oral BID    gabapentin  100 mg Oral TID    pantoprazole  40 mg Oral BID AC    traZODone  50 mg Oral Nightly    sodium chloride flush  10 mL Intravenous 2 times per day    losartan  50 mg Oral Daily    hydroCHLOROthiazide  12.5 mg Oral Daily     Continuous Infusions:   sodium chloride Stopped (12/07/20 8209)     PRN Meds:   sodium chloride flush, 10 mL, PRN  benzonatate, 100 mg, TID PRN  acetaminophen, 650 mg, Q6H PRN    Or  acetaminophen, 650 mg, Q6H PRN  polyethylene glycol, 17 g, Daily PRN  promethazine, 12.5 mg, Q6H PRN    Or  ondansetron, 4 mg, Q6H PRN  guaiFENesin-dextromethorphan, 5 mL, Q4H PRN  sodium chloride, 30 mL, PRN          VENT SETTINGS (Comprehensive) (if applicable):  Vent Information  Skin Assessment: Clean, dry, & intact  FiO2 : (S) 90 %(found on 100%)  SpO2: 94 %  SpO2/FiO2 ratio: 104.44  Humidification Source: Heated wire  Humidification Temp: 37  Humidification Temp Measured: 37  Additional Respiratory  Assessments  Pulse: 54  Resp: 19  SpO2: 94 %  Humidification Source: Heated wire  Humidification Temp: 37  Oral Care: Mouth swabbed        Laboratory findings:    Complete Blood Count:   Recent Labs     12/07/20  0330 12/08/20  0530   WBC 11.6* 12.2*   HGB 10.5* 9.4*   HCT 33.3* 29.4*    129*        Last 3 Blood Glucose:   Recent Labs     12/06/20  0310 12/07/20  0330   GLUCOSE 136* 146*        PT/INR:    Lab Results   Component Value Date    PROTIME 12.8 12/07/2020    INR 1.1 12/07/2020     PTT:    Lab Results   Component Value Date    APTT 30.3 12/07/2020       Comprehensive Metabolic Profile:   Recent Labs 12/06/20  0310 12/07/20  0330    137   K 4.6 4.2    103   CO2 31* 29   BUN 34* 27*   CREATININE 1.0 0.9   GLUCOSE 136* 146*   CALCIUM 8.5* 8.5*   PROT 5.2* 5.2*   LABALBU 2.1* 2.0*   BILITOT 0.4 0.4   ALKPHOS 102 74   AST 24 26   ALT 22 26      Magnesium:   Lab Results   Component Value Date    MG 1.7 01/18/2018     Phosphorus: No results found for: PHOS  Ionized Calcium: No results found for: CAION     Urinalysis:     Troponin: No results for input(s): TROPONINI in the last 72 hours. Microbiology:    Cultures during this admission:     Blood cultures:                 [] None drawn      [x] Negative             []  Positive (Details:  )  Urine Culture:                   [] None drawn      [x] Negative             []  Positive (Details:  )  Sputum Culture:               [x] None drawn       [] Negative             []  Positive (Details:  )   Endotracheal aspirate:     [x] None drawn       [] Negative             []  Positive (Details:  )     Other pertinent Labs:       Radiology/Imaging:   Xr Chest (2 Vw)    Result Date: 11/24/2020  EXAMINATION: TWO XRAY VIEWS OF THE CHEST 11/24/2020 2:21 pm COMPARISON: January 18, 2018 HISTORY: ORDERING SYSTEM PROVIDED HISTORY: Cough FINDINGS: There is some patchy opacity in the left mid lung and at the right base which likely represent developing pneumonic infiltrates. Stable cardiomediastinal silhouette. Neither costophrenic angle appears blunted. Developing patchy pneumonic infiltrates bilaterally. Xr Hip Bilateral W Ap Pelvis (2 Views)    Result Date: 12/4/2020  EXAMINATION: ONE XRAY VIEW OF THE PELVIS AND TWO XRAY VIEWS OF EACH OF THE BILATERAL HIPS 12/4/2020 1:28 pm COMPARISON: None. HISTORY: ORDERING SYSTEM PROVIDED HISTORY: Fall, fracture TECHNOLOGIST PROVIDED HISTORY: Bedside if possible Reason for exam:->Fall, fracture FINDINGS: There is no evidence of acute fracture or dislocation involving the left hip or right hip.   Mild degenerative narrowing involves the hips bilaterally. No aggressive osseous lesion. The visualized bowel gas pattern is nonobstructive. There are least mild degenerative changes of the lower lumbar spine. No acute osseous abnormality. Mild degenerative changes of the hips bilaterally. Xr Chest Portable    Result Date: 12/7/2020  EXAMINATION: ONE XRAY VIEW OF THE CHEST 12/7/2020 12:07 pm COMPARISON: November 27 and December 4 HISTORY: ORDERING SYSTEM PROVIDED HISTORY: resp failure TECHNOLOGIST PROVIDED HISTORY: Reason for exam:->resp failure FINDINGS: Persistent bilateral infiltrates as observed on December 4 study. The diffuse ground-glass density are seen throughout both lungs more prominent in the mid upper aspect of both lungs and towards the base on the left. Heart has normal size. There is no pleural effusions. No significant change since the December 4. Xr Chest Portable    Result Date: 12/4/2020  EXAMINATION: ONE XRAY VIEW OF THE CHEST 12/4/2020 9:17 am COMPARISON: Previous CT scan of 11/27/2020. HISTORY: ORDERING SYSTEM PROVIDED HISTORY: covid pneumonia TECHNOLOGIST PROVIDED HISTORY: Reason for exam:->covid pneumonia FINDINGS: Multifocal bilateral pulmonary infiltrates are noted. The infiltrates are more widespread and dense when compared with the patient's prior study. There is no pleural effusion. 1. Multifocal bilateral pulmonary infiltrates which are more dense and more widespread when compared with the patient's prior CT scan of 11/27/2020. Xr Chest Portable    Result Date: 11/27/2020  EXAMINATION: ONE XRAY VIEW OF THE CHEST 11/27/2020 1:15 pm COMPARISON: 24 November 2020 HISTORY: ORDERING SYSTEM PROVIDED HISTORY: shortness of breath, known covid TECHNOLOGIST PROVIDED HISTORY: Reason for exam:->shortness of breath, known covid FINDINGS: There is increasing bilateral airspace disease which is geographically much more extensive than on the prior study. Suspect that this represents progressive pneumonia. Cardiomediastinal silhouette is stable. Neither costophrenic angle is visibly blunted. Notably worsening bilateral airspace disease which may relate to progressive pneumonia. Cta Pulmonary W Contrast    Result Date: 11/27/2020  EXAMINATION: CTA OF THE CHEST 11/27/2020 3:27 pm TECHNIQUE: CTA of the chest was performed after the administration of intravenous contrast.  Multiplanar reformatted images are provided for review. MIP images are provided for review. Dose modulation, iterative reconstruction, and/or weight based adjustment of the mA/kV was utilized to reduce the radiation dose to as low as reasonably achievable. COMPARISON: None. HISTORY: ORDERING SYSTEM PROVIDED HISTORY: Eval for PE, known COVID TECHNOLOGIST PROVIDED HISTORY: Reason for exam:->Eval for PE, known COVID FINDINGS: Pulmonary Arteries: Pulmonary arteries are adequately opacified for evaluation. No evidence of intraluminal filling defect to suggest pulmonary embolism. Main pulmonary artery is normal in caliber. Mediastinum: No evidence of mediastinal lymphadenopathy. The heart is mildly enlarged. There is no pericardial effusion. Lungs/pleura: Multifocal bilateral ground-glass pulmonary infiltrates are noted most consistent with COVID-19 pneumonia. There is no pleural effusion. Upper Abdomen: Limited images of the upper abdomen are unremarkable. Soft Tissues/Bones: No acute bone or soft tissue abnormality. 1. There is no evidence of a pulmonary embolus. 2. Multifocal bilateral ground-glass pulmonary infiltrates most consistent with COVID-19 pneumonia.        Chest Xray (12/8/2020):    ASSESSMENT:     Patient Active Problem List    Diagnosis Date Noted    Acute respiratory failure with hypoxia ([address]     Pneumonia due to COVID-19 virus     Community acquired pneumonia of right upper lobe of lung     Stage 3a chronic kidney disease     COVID-19 11/27/2020    OC (onychocryptosis) 05/22/2020    Primary insomnia 09/24/2018    Hypertension     Hyperlipidemia     Hernia, hiatal     GERD (gastroesophageal reflux disease)     Arthritis     Iron deficiency anemia due to chronic blood loss 06/19/2016    Near syncope 06/19/2016       Additional assessment:    ·         PLAN:     WEAN PER PROTOCOL:  [x] No   [] Yes  [] N/A    DISCONTINUE ANY LABS:   [x] No   [] Yes    ICU PROPHYLAXIS:  Stress ulcer:  [x] PPI Agent  [] [id number] [] Sucralfate  [] Other:  VTE:   [x] Enoxaparin  [] Unfract. Heparin Subcut  [] EPC Cuffs    NUTRITION:  [] NPO [] Tube Feeding (Specify: ) [] TPN  [x] PO (Diet: Dietary Nutrition Supplements: Standard High Calorie Oral Supplement  DIET CLEAR LIQUID;)    HOME MEDICATIONS RECONCILED: [] No  [x] Yes    INSULIN DRIP:   [x] No   [] Yes    CONSULTATION NEEDED:  [x] No   [] Yes    FAMILY UPDATED:    [] No   [x] Yes    TRANSFER OUT OF ICU:   [x] No   [] Yes    ADDITIONAL PLAN:  Neuro   Continue home gabapentin, trazodone     Respiratory   Hypoxic respiratory failure-continue on OptiFlow, may require noninvasive ventilation or possibly even intubation if she continues to deteriorate  Wean oxygen as tolerated. Keep O2 sat 90-92%     Cardiovascular   Continue home losartan, HCTZ, Lipitor     Gastrointestinal   GI prophylaxis with Protonix     Renal   Creatinine appears stable at 0.9-1.0, continue to monitor     Infectious Disease   COVID-19 pneumonia with possible superimposed bacterial pneumonia-ordered Legionella and strep antigens, blood cultures, urine cultures-give antibiotics-vanc, cefepime-, continue Decadron, thiamine, vitamin C, and zinc     Hematology/Oncology   Full dose Lovenox for DVT prophylaxis  Continue home iron due to patient's underlying chronic anemia     Endocrine   Clear liquid diet     Social/Spiritual/DNR/Other   Full code     ASSESSMENT/ PLAN   1. Continue full dose Lovenox, home meds  2.  Hypoxic respiratory failure-continue OptiFlow, nonrebreather- was on optiflow through the night but desaturated 12/8 am and required NRB again  3. COVID-19 with superimposed bacterial pneumonia-continue Decadron, zinc, vitamin C, thiamine, ordered blood cultures, urinalysis, start empiric antibiotics-vanc, cefepime  4. Patient requesting to go home as she does not like clear liquid diet, speak to daughter about code status    Chan Mcgee MD PGY-1  12/8/2020 11:13 AM    Critical Care Attending Addendum:    Patient seen and examined with the house staff. X-rays personally reviewed through the PACS. Family is updated at the bedside as available. Additional findings listed below as necessary. Additional comments:  5. She is in persistent severe acute hypoxic respiratory failure from COVID with probable bacterial superinfection. She is maintained on Airvo but does not wish to continue aggressive care any longer so I contacted her daughter Lauro Guerrero and discussed this with her. She agrees to follow her mom's wish to pass away at home so will make DNR CC and consult hospice for hopefully placement in the home. 6. Hyponatremia likely siadh will not be treated in light of above.     35 min CCT

## 2020-12-08 NOTE — FLOWSHEET NOTE
Dr. Alex Pandey spoke with patient then daughter Santosh Ferreira. Patient expressed wish to go home with hospice. Daughter in agreement. Hospice notfied.

## 2020-12-08 NOTE — PROGRESS NOTES
[facility] [place]    [facility] Information Visit Note              Patient Name: Merle Woodard   :  1933  MRN:  [id number]    Admit date:  2020    Admitted from: [facility] Admitting Physician:  Jazzy Morrison MD   PCP:  Jason Ashraf DO      Primary Insurance: Payor: [facility] /  /  /    Secondary Insurance:  none    Emergency Contact:      Contact/Relation:   /         Phone:       Contact/Relation:   /     Phone:     Advance Directive  Advance directives received No  Patient has NOT completed an advance directive  and Patient does not have a documented healthcare surrogate  Discussed with: Family member  DPOA-HC Name-Relation:    Phone:       Terminal Diagnosis Acute hypoxic respiratory failure due to viral PNA as confirmed by Dr. Yady Gross Problem List:   Patient Active Problem List   Diagnosis Code    Iron deficiency anemia due to chronic blood loss D50.0    Near syncope R55    Hypertension I10    Hyperlipidemia E78.5    Hernia, hiatal K44.9    GERD (gastroesophageal reflux disease) K21.9    Arthritis M19.90    Primary insomnia F51.01    OC (onychocryptosis) L60.0    COVID-19 U07.1    Acute respiratory failure with hypoxia ([facility] [address] J96.01    Pneumonia due to COVID-19 virus U07.1, J12.89    Community acquired pneumonia of right upper lobe of lung J18.9    Stage 3a chronic kidney disease N18.31       Code Status Order: DNR-CC     Past Medical History:        Diagnosis Date    Arthritis     GERD (gastroesophageal reflux disease)     Hernia, hiatal     Hyperlipidemia     Hypertension      Past Surgical History:        Procedure Laterality Date    UPPER GASTROINTESTINAL ENDOSCOPY      UPPER GASTROINTESTINAL ENDOSCOPY  2016    1 biopsy       Allergies:  Hydrocodone-homatropine    Family Goal: [address] held with Daughter, Nathan Bonds    Referral Received. Chart Reviewed. Patient, Perez Gilbert has chosen to become a DNR-CC.  She wants to go home to die. Daughter Candy Alexander tearful but in agreement. The hospice benefit and philosophy were explained including that hospice is end of life care in which, per Medicare, a patient has a terminal diagnosis that life expectancy would be 6 months or less. Hospice care is a service that is covered by most insurance plans. Family informed that with the routine level of care at home, the hospice team consists of the RN who visits 1-3 times a week, a  who visits within the first five days of the hospice election, the personal care team who visit 1-3 times a week, non-medical volunteers and Chaplains. Explained that at home in routine level of care, familles are responsible for the 24 hour care. Discussed that under hospice care patient would not receive chemotherapy, radiation, immune therapy, IV antibiotics, dialysis or blood transfusions. Explained that once in hospice care, all aggressive treatments would be stopped and allow nature to takes its course with focus on comfort care for the patient. Explained to Candy Alexander needing to decrease Jaunita's oxygen to 10L for her to be able to go home. We reviewed possible outcomes when the oxygen is decreased. Candy Alexander is understanding patient may decline quickly. She hopes her mother will make it home. DME faxed order for oxygen at home to be delivered this afternoon 2pm-5pm.     Spoke with Dr. Abrahan Pa regarding decreasing oxygen and having comfort meds ordered for dyspnea. Plan to decrease oxygen prior to transport home. Reviewed comfort scripts needed to go home with patient. Resident to write scripts and call me for delivery to pharmacy. Set up transportation for 1700 via [employer] per family's request as physicians ambulance could not transport until after 1900. Medical Director to follow. Updated Davi Cueto. Updated intake on discharge and need for admittance tonight.      Discharge Plan:  Discharge Disposition; Home  Facility Name: none    HOTV plan:  1. Decrease oxygen prior to transport home. 2. Obtain scripts, call [employer], deliver to pharmacy. 3. Please call [facility]OT[facility] [phone] with any questions. 4. Patient not currently under the care of hospice.     Electronically signed by Marion Nayak RN on 12/8/2020 at 12:42 PM

## 2020-12-08 NOTE — FLOWSHEET NOTE
Patient medicated for discomfort with Fentanyl. Patient placed on 10 liters nasal cannula for transport home. All belongings gathered. Ok to leave bruner per hospice to assist family. Left anticubatal iv removed. L wrist to be removed before transport.

## 2020-12-08 NOTE — DISCHARGE SUMMARY
[facility] [place] [facility] Physician Discharge Summary       Axel Jones DO  [address] [address]  [phone]    Schedule an appointment as soon as possible for a visit  hospital follow up    [facility]  phone [phone]  fax [phone]    home care      Activity level: As tolerated    Diet: Dietary Nutrition Supplements: Standard High Calorie Oral Supplement  DIET GENERAL;    Dispo:Home with Hospice care    Condition on discharge guarded    She was discharged on O2 10 L NC    Patient ID:  Teri Cortez  [id number]  80 y.o.  12/27/1933    Admit date: 11/27/2020    Discharge date and time:  12/8/2020  2:11 PM    Admission Diagnoses: Active Problems:    COVID-19    Acute respiratory failure with hypoxia (HCC)    Pneumonia due to COVID-19 virus    Community acquired pneumonia of right upper lobe of lung    Stage 3a chronic kidney disease  Resolved Problems:    * No resolved hospital problems. *      Discharge Diagnoses: Active Problems:    COVID-19    Acute respiratory failure with hypoxia (HCC)    Pneumonia due to COVID-19 virus    Community acquired pneumonia of right upper lobe of lung    Stage 3a chronic kidney disease  Resolved Problems:    * No resolved hospital problems.  *      Consults:  IP CONSULT TO PHARMACY  IP CONSULT TO PULMONOLOGY  IP CONSULT TO HOME CARE NEEDS  IP CONSULT TO [facility] Course: She is an 80 yr old female with PMH of HTN , HPL came to er with worsening of sob over 4 days PTA, cough with minimal mucus production. On admission she was hypoxic and required 10 L O2 . She was covid Positive. She was started on remdesivir, decadron and placed on droplet precautions. Admission CT  Showed multifocal GG pulm infiltrates most consistent with covid -19 pneumonia. She was treated with IV ceftriaxone for possible superimposed bacterial pneumonia. PULM was consulted and following during hospital stay. She continued to require hi flow O2 and later she was transferred in ICU for opti flow. During hospital stay she completed abx, remdesivir as well as decadron course. She had elevated d dimer and was placed on lovenox as per covid protocol. While in ICU she had new fever & she was placed on IV cefepime. Lovenox was changed to full dose . Later as per family's discussion with intensivist , pulmonary - family opted to take her home with hospice care. Advance directives were changed to [facility]. She was discharged as above with home hospice. Other co morbid conditions were managed by continuing home meds. Discharge Exam:  Vitals:    12/08/20 0900 12/08/20 1000 12/08/20 1059 12/08/20 1100   BP: (!) 119/57 (!) 116/56     Pulse: 52 59     Resp: 20 25 (!) 31    Temp:       TempSrc:       SpO2: 92% 91% 92% 92%   Weight:       Height:         Due to patient's COVID-19 positive status, to reduce exposure, contamination and in  an effort to conserve PPE this patient was evaluated through a combination of review of the medical record,, nursing assessment and other physicians exams and assessments as well as telemedicine interaction. General Appearance: alert and oriented to person, sob but no overt distress noted . Requires Optiflow  Skin: warm and dry  Head: normocephalic and atraumatic  Eyes: pupils equal, round, and reactive to light, extraocular eye movements intact, conjunctivae normal Neck: supple and non-tender without mass  Pulmonary/Chest: diffuse bi basilar dry rales  Cardiovascular: normal rate, regular rhythm, normal S1 and S2  Abdomen: soft, non-tender, non-distended, normal bowel sounds, no masses or organomegaly  Extremities: no cyanosis, clubbing   Musculoskeletal: normal range of motion  Neurologic:no cranial nerve deficit,  speech normal        LABS:  Recent Labs     12/06/20  0310 12/07/20  0330 12/08/20  0932    137 131*   K 4.6 4.2 4.8    103 105   CO2 31* 29 26   BUN 34* 27* 36*   CREATININE 1.0 0.9 1.0   GLUCOSE 136* 146* 265*   CALCIUM 8.5* 8.5* 8.4*       Recent Labs     12/07/20  0330 12/08/20  0530   WBC 11.6* 12.2*   RBC 4.22 3.65   HGB 10.5* 9.4*   HCT 33.3* 29.4*   MCV 78.9* 80.5   MCH 24.9* 25.8*   MCHC 31.5* 32.0   RDW 14.4 16.7*    129*   MPV 10.3 NOT CALC       No results for input(s): POCGLU in the last 72 hours. Imaging:   XR CHEST PORTABLE   Final Result   No significant change since the December 4. XR HIP BILATERAL W AP PELVIS (2 VIEWS)   Final Result   No acute osseous abnormality. Mild degenerative changes of the hips bilaterally. XR CHEST PORTABLE   Final Result   1. Multifocal bilateral pulmonary infiltrates which are more dense and more   widespread when compared with the patient's prior CT scan of 11/27/2020. CTA PULMONARY W CONTRAST   Final Result   1. There is no evidence of a pulmonary embolus. 2. Multifocal bilateral ground-glass pulmonary infiltrates most consistent   with COVID-19 pneumonia. XR CHEST PORTABLE   Final Result   Notably worsening bilateral airspace disease which may relate to progressive   pneumonia.           Patient Instructions:      Medication List      START taking these medications    glycopyrrolate 1 MG tablet  Commonly known as:  ROBINUL  Take 2 tablets by mouth every 6 hours as needed (secretions)     LORazepam 0.5 MG tablet  Commonly known as:  Ativan Take 1 tablet by mouth every 4 hours as needed for Anxiety (dyspnea) for up to 2 days. morphine sulfate 20 MG/ML concentrated oral solution  Take 0.5 mLs by mouth every 2 hours as needed for Pain (dyspnea) for up to 2 days. CONTINUE taking these medications    benzonatate 100 MG capsule  Commonly known as:  TESSALON  Take 1 capsule by mouth 3 times daily as needed for Cough     dexamethasone 6 MG tablet  Commonly known as:  DECADRON  Take 1 tablet by mouth daily (with breakfast) for 7 days     ferrous sulfate 325 (65 Fe) MG tablet  Commonly known as:  IRON 325  TAKE 1 TABLET BY MOUTH TWICE DAILY     fluticasone 220 MCG/ACT inhaler  Commonly known as:  FLOVENT HFA     gabapentin 100 MG capsule  Commonly known as:  NEURONTIN  Take 1 capsule by mouth 3 times daily for 180 days. Intended supply: 30 days     hydroCHLOROthiazide 12.5 MG tablet  Commonly known as:  HYDRODIURIL  Take 1 tablet by mouth daily     losartan 50 MG tablet  Commonly known as:  COZAAR  Take 1 tablet by mouth daily     metoclopramide 10 MG tablet  Commonly known as:  REGLAN     mometasone 0.1 % cream  Commonly known as:  Elocon  Apply topically daily.      pantoprazole 40 MG tablet  Commonly known as:  PROTONIX  Take 1 tablet by mouth 2 times daily (before meals)     simvastatin 10 MG tablet  Commonly known as:  ZOCOR     traZODone 50 MG tablet  Commonly known as:  DESYREL  Take 1 tablet by mouth nightly     valACYclovir 1 g tablet  Commonly known as:  VALTREX        STOP taking these medications    amLODIPine 5 MG tablet  Commonly known as:  NORVASC     azithromycin 250 MG tablet  Commonly known as:  Zithromax Z-Alan     brompheniramine-pseudoephedrine-DM 2-30-10 MG/5ML syrup     cefdinir 300 MG capsule  Commonly known as:  OMNICEF           Where to Get Your Medications      These medications were sent to [facility], [address] [place] [address] [phone] - F [phone]  [facility] [place] [facility] [address] Phone:  [phone]   · benzonatate 100 MG capsule  · dexamethasone 6 MG tablet  · glycopyrrolate 1 MG tablet     You can get these medications from any pharmacy    Bring a paper prescription for each of these medications  · LORazepam 0.5 MG tablet  · morphine sulfate 20 MG/ML concentrated oral solution           Note that more than 30 minutes was spent in preparing discharge papers, discussing discharge with patient, medication review, etc.    Signed:  Electronically signed by Guanako Minor MD on 12/8/2020 at 2:11 PM    NOTE: This report was transcribed using voice recognition software. Every effort was made to ensure accuracy; however, inadvertent computerized transcription errors may be present.

## 2020-12-12 LAB
BLOOD CULTURE, ROUTINE: NORMAL
CULTURE, BLOOD 2: NORMAL
